# Patient Record
Sex: FEMALE | Race: WHITE | NOT HISPANIC OR LATINO | Employment: UNEMPLOYED | RURAL
[De-identification: names, ages, dates, MRNs, and addresses within clinical notes are randomized per-mention and may not be internally consistent; named-entity substitution may affect disease eponyms.]

---

## 2020-01-21 ENCOUNTER — HISTORICAL (OUTPATIENT)
Dept: ADMINISTRATIVE | Facility: HOSPITAL | Age: 40
End: 2020-01-21

## 2020-01-24 LAB
LAB AP GENERAL CAT - HISTORICAL: NORMAL
LAB AP INTERPRETATION/RESULT - HISTORICAL: NEGATIVE
LAB AP SPECIMEN ADEQUACY - HISTORICAL: NORMAL
LAB AP SPECIMEN SUBMITTED - HISTORICAL: NORMAL

## 2020-03-05 ENCOUNTER — HISTORICAL (OUTPATIENT)
Dept: ADMINISTRATIVE | Facility: HOSPITAL | Age: 40
End: 2020-03-05

## 2020-03-05 LAB
BASOPHILS # BLD AUTO: 0.02 X10E3/UL (ref 0–0.2)
BASOPHILS NFR BLD AUTO: 0.5 % (ref 0–1)
BUN SERPL-MCNC: 10 MG/DL (ref 7–18)
CALCIUM SERPL-MCNC: 8.6 MG/DL (ref 8.5–10.1)
CHLORIDE SERPL-SCNC: 103 MMOL/L (ref 98–107)
CO2 SERPL-SCNC: 26 MMOL/L (ref 21–32)
CREAT SERPL-MCNC: 0.96 MG/DL (ref 0.55–1.02)
EOSINOPHIL # BLD AUTO: 0.01 X10E3/UL (ref 0–0.5)
EOSINOPHIL NFR BLD AUTO: 0.2 % (ref 1–4)
ERYTHROCYTE [DISTWIDTH] IN BLOOD BY AUTOMATED COUNT: 12.8 % (ref 11.5–14.5)
GLUCOSE SERPL-MCNC: 80 MG/DL (ref 74–106)
HCT VFR BLD AUTO: 41.3 % (ref 38–47)
HGB BLD-MCNC: 13.4 G/DL (ref 12–16)
IMM GRANULOCYTES # BLD AUTO: 0.01 X10E3/UL (ref 0–0.04)
IMM GRANULOCYTES NFR BLD: 0.2 % (ref 0–0.4)
LYMPHOCYTES # BLD AUTO: 1.58 X10E3/UL (ref 1–4.8)
LYMPHOCYTES NFR BLD AUTO: 35.8 % (ref 27–41)
MCH RBC QN AUTO: 28 PG (ref 27–31)
MCHC RBC AUTO-ENTMCNC: 32.4 G/DL (ref 32–36)
MCV RBC AUTO: 86.2 FL (ref 80–96)
MONOCYTES # BLD AUTO: 0.46 X10E3/UL (ref 0–0.8)
MONOCYTES NFR BLD AUTO: 10.4 % (ref 2–6)
MPC BLD CALC-MCNC: 10.5 FL (ref 9.4–12.4)
NEUTROPHILS # BLD AUTO: 2.33 X10E3/UL (ref 1.8–7.7)
NEUTROPHILS NFR BLD AUTO: 52.9 % (ref 53–65)
NRBC # BLD AUTO: 0 X10E3/UL (ref 0–0)
NRBC, AUTO (.00): 0 /100 (ref 0–0)
PLATELET # BLD AUTO: 225 X10E3/UL (ref 150–400)
POTASSIUM SERPL-SCNC: 3.6 MMOL/L (ref 3.5–5.1)
RBC # BLD AUTO: 4.79 X10E6/UL (ref 4.2–5.4)
SODIUM SERPL-SCNC: 138 MMOL/L (ref 136–145)
WBC # BLD AUTO: 4.41 X10E3/UL (ref 4.5–11)

## 2020-03-06 ENCOUNTER — HISTORICAL (OUTPATIENT)
Dept: ADMINISTRATIVE | Facility: HOSPITAL | Age: 40
End: 2020-03-06

## 2020-03-06 LAB — RAPID GROUP A STREP ANTIGEN: NEGATIVE

## 2022-02-24 ENCOUNTER — OFFICE VISIT (OUTPATIENT)
Dept: OBSTETRICS AND GYNECOLOGY | Facility: CLINIC | Age: 42
End: 2022-02-24
Payer: MEDICAID

## 2022-02-24 VITALS
RESPIRATION RATE: 18 BRPM | DIASTOLIC BLOOD PRESSURE: 87 MMHG | TEMPERATURE: 98 F | HEIGHT: 66 IN | SYSTOLIC BLOOD PRESSURE: 113 MMHG | BODY MASS INDEX: 31.34 KG/M2 | HEART RATE: 90 BPM | WEIGHT: 195 LBS

## 2022-02-24 DIAGNOSIS — N92.0 MENORRHAGIA WITH REGULAR CYCLE: Primary | ICD-10-CM

## 2022-02-24 LAB
BASOPHILS # BLD AUTO: 0.06 K/UL (ref 0–0.2)
BASOPHILS NFR BLD AUTO: 0.8 % (ref 0–1)
DIFFERENTIAL METHOD BLD: ABNORMAL
EOSINOPHIL # BLD AUTO: 0.07 K/UL (ref 0–0.5)
EOSINOPHIL NFR BLD AUTO: 1 % (ref 1–4)
ERYTHROCYTE [DISTWIDTH] IN BLOOD BY AUTOMATED COUNT: 13.6 % (ref 11.5–14.5)
FSH SERPL-ACNC: 5.6 MIU/ML (ref 1.7–134.8)
HCT VFR BLD AUTO: 37.5 % (ref 38–47)
HGB BLD-MCNC: 12.2 G/DL (ref 12–16)
IMM GRANULOCYTES # BLD AUTO: 0.02 K/UL (ref 0–0.04)
IMM GRANULOCYTES NFR BLD: 0.3 % (ref 0–0.4)
LH SERPL-ACNC: 5 MIU/ML
LYMPHOCYTES # BLD AUTO: 2.93 K/UL (ref 1–4.8)
LYMPHOCYTES NFR BLD AUTO: 41.4 % (ref 27–41)
MCH RBC QN AUTO: 27.4 PG (ref 27–31)
MCHC RBC AUTO-ENTMCNC: 32.5 G/DL (ref 32–36)
MCV RBC AUTO: 84.3 FL (ref 80–96)
MONOCYTES # BLD AUTO: 0.48 K/UL (ref 0–0.8)
MONOCYTES NFR BLD AUTO: 6.8 % (ref 2–6)
MPC BLD CALC-MCNC: 11.2 FL (ref 9.4–12.4)
NEUTROPHILS # BLD AUTO: 3.51 K/UL (ref 1.8–7.7)
NEUTROPHILS NFR BLD AUTO: 49.7 % (ref 53–65)
NRBC # BLD AUTO: 0 X10E3/UL
NRBC, AUTO (.00): 0 %
PLATELET # BLD AUTO: 297 K/UL (ref 150–400)
RBC # BLD AUTO: 4.45 M/UL (ref 4.2–5.4)
TESTOST SERPL-MCNC: 27 NG/DL (ref 8–60)
TSH SERPL DL<=0.005 MIU/L-ACNC: 1.65 UIU/ML (ref 0.36–3.74)
WBC # BLD AUTO: 7.07 K/UL (ref 4.5–11)

## 2022-02-24 PROCEDURE — 84403 TESTOSTERONE: ICD-10-PCS | Mod: ,,, | Performed by: CLINICAL MEDICAL LABORATORY

## 2022-02-24 PROCEDURE — 3074F SYST BP LT 130 MM HG: CPT | Mod: CPTII,,, | Performed by: OBSTETRICS & GYNECOLOGY

## 2022-02-24 PROCEDURE — 84443 TSH: ICD-10-PCS | Mod: ,,, | Performed by: CLINICAL MEDICAL LABORATORY

## 2022-02-24 PROCEDURE — 85025 CBC WITH DIFFERENTIAL: ICD-10-PCS | Mod: ,,, | Performed by: CLINICAL MEDICAL LABORATORY

## 2022-02-24 PROCEDURE — 3074F PR MOST RECENT SYSTOLIC BLOOD PRESSURE < 130 MM HG: ICD-10-PCS | Mod: CPTII,,, | Performed by: OBSTETRICS & GYNECOLOGY

## 2022-02-24 PROCEDURE — 83001 ASSAY OF GONADOTROPIN (FSH): CPT | Mod: ,,, | Performed by: CLINICAL MEDICAL LABORATORY

## 2022-02-24 PROCEDURE — 3079F DIAST BP 80-89 MM HG: CPT | Mod: CPTII,,, | Performed by: OBSTETRICS & GYNECOLOGY

## 2022-02-24 PROCEDURE — 84443 ASSAY THYROID STIM HORMONE: CPT | Mod: ,,, | Performed by: CLINICAL MEDICAL LABORATORY

## 2022-02-24 PROCEDURE — 84403 ASSAY OF TOTAL TESTOSTERONE: CPT | Mod: ,,, | Performed by: CLINICAL MEDICAL LABORATORY

## 2022-02-24 PROCEDURE — 83002 LUTEINIZING HORMONE: ICD-10-PCS | Mod: ,,, | Performed by: CLINICAL MEDICAL LABORATORY

## 2022-02-24 PROCEDURE — 85025 COMPLETE CBC W/AUTO DIFF WBC: CPT | Mod: ,,, | Performed by: CLINICAL MEDICAL LABORATORY

## 2022-02-24 PROCEDURE — 36415 COLL VENOUS BLD VENIPUNCTURE: CPT | Mod: ,,, | Performed by: OBSTETRICS & GYNECOLOGY

## 2022-02-24 PROCEDURE — 99203 OFFICE O/P NEW LOW 30 MIN: CPT | Mod: ,,, | Performed by: OBSTETRICS & GYNECOLOGY

## 2022-02-24 PROCEDURE — 83001 FOLLICLE STIMULATING HORMONE: ICD-10-PCS | Mod: ,,, | Performed by: CLINICAL MEDICAL LABORATORY

## 2022-02-24 PROCEDURE — 99203 PR OFFICE/OUTPT VISIT, NEW, LEVL III, 30-44 MIN: ICD-10-PCS | Mod: ,,, | Performed by: OBSTETRICS & GYNECOLOGY

## 2022-02-24 PROCEDURE — 1159F PR MEDICATION LIST DOCUMENTED IN MEDICAL RECORD: ICD-10-PCS | Mod: CPTII,,, | Performed by: OBSTETRICS & GYNECOLOGY

## 2022-02-24 PROCEDURE — 36415 PR COLLECTION VENOUS BLOOD,VENIPUNCTURE: ICD-10-PCS | Mod: ,,, | Performed by: OBSTETRICS & GYNECOLOGY

## 2022-02-24 PROCEDURE — 3079F PR MOST RECENT DIASTOLIC BLOOD PRESSURE 80-89 MM HG: ICD-10-PCS | Mod: CPTII,,, | Performed by: OBSTETRICS & GYNECOLOGY

## 2022-02-24 PROCEDURE — 83002 ASSAY OF GONADOTROPIN (LH): CPT | Mod: ,,, | Performed by: CLINICAL MEDICAL LABORATORY

## 2022-02-24 PROCEDURE — 3008F BODY MASS INDEX DOCD: CPT | Mod: CPTII,,, | Performed by: OBSTETRICS & GYNECOLOGY

## 2022-02-24 PROCEDURE — 3008F PR BODY MASS INDEX (BMI) DOCUMENTED: ICD-10-PCS | Mod: CPTII,,, | Performed by: OBSTETRICS & GYNECOLOGY

## 2022-02-24 PROCEDURE — 1159F MED LIST DOCD IN RCRD: CPT | Mod: CPTII,,, | Performed by: OBSTETRICS & GYNECOLOGY

## 2022-02-24 NOTE — PROGRESS NOTES
History & Physical    SUBJECTIVE:     History of Present Illness:  Patient is a 41 y.o. female presents with possible pregnancy/ Pt stats she had a tubal reversal and symptoms of pregnancy with tender breasts and nausea but pt states her LMP 02/10/2022.      Chief Complaint   Patient presents with    Miscarriage     Pt states she had a positive UPT 09/2021 then started bleeding 10/2021, UPT negative 10/2021. Pt states she thinks she may have had a miscarriage.       Review of patient's allergies indicates:  No Known Allergies    No current outpatient medications on file.     No current facility-administered medications for this visit.       Past Medical History:   Diagnosis Date    Abnormal Pap smear of cervix 2003     Past Surgical History:   Procedure Laterality Date    CERVICAL BIOPSY  W/ LOOP ELECTRODE EXCISION      CHOLECYSTECTOMY      FALLOPIAN TUBOPLASTY Bilateral     TUBAL LIGATION  2003     Family History   Family history unknown: Yes     Social History     Tobacco Use    Smoking status: Never Smoker    Smokeless tobacco: Never Used   Substance Use Topics    Alcohol use: Not Currently    Drug use: Not Currently        Review of Systems:  Review of Systems   Constitutional: Negative for appetite change, chills, fatigue and fever.   HENT: Negative.    Eyes: Negative.    Respiratory: Negative for cough, chest tightness and shortness of breath.    Cardiovascular: Negative for chest pain, palpitations and leg swelling.   Gastrointestinal: Positive for nausea. Negative for abdominal distention, abdominal pain, blood in stool, constipation, diarrhea and vomiting.   Endocrine: Negative for cold intolerance, heat intolerance, polydipsia, polyphagia and polyuria.   Genitourinary: Negative for difficulty urinating, dyspareunia, dysuria, flank pain, frequency, pelvic pain, urgency, vaginal bleeding, vaginal discharge and vaginal pain.   Musculoskeletal: Negative.    Skin: Negative.    Neurological: Negative.     Psychiatric/Behavioral: Negative.  Negative for agitation, behavioral problems, confusion and sleep disturbance. The patient is not nervous/anxious.         Office Visit on 02/24/2022   Component Date Value Ref Range Status    FSH 02/24/2022 5.6  1.7 - 134.8 mIU/mL Final      Men:     0.7 - 10.8 mIU/mL    Women:  Follicular Phase:      2.3 - 12.6 mIU/mL  Mid-Cycle Peak:      5.2 - 17.5 mIU/mL  Luteal Phase:          1.7 - 12.9 mIU/mL    Postmenopausal:  On Hormone Therapy:     5.9 - 72.8 mIU/mL  Untreated:                        12.7 - 132.2 mIU/mL    LH, Blood 02/24/2022 5.0  mIu/mL Final      Men:       1.2 - 10.6 mIU/mL    Women:  Follicular Phase:        1.9 - 12.8 mIU/mL  Mid-Cycle Peak:        22.8 - 76.1 mIU/mL  Luteal Phase:             0.6 - 13.5 mIU/mL    Post-Menopausal:  On Hormone Therapy:           1.1 - 52.4 mIU/mL  Untreated:                              0.6 - 61.8 mIU/mL        TSH 02/24/2022 1.650  0.358 - 3.740 uIU/mL Final    Testosterone 02/24/2022 27.0  8.0 - 60.0 ng/dL Final    WBC 02/24/2022 7.07  4.50 - 11.00 K/uL Final    RBC 02/24/2022 4.45  4.20 - 5.40 M/uL Final    Hemoglobin 02/24/2022 12.2  12.0 - 16.0 g/dL Final    Hematocrit 02/24/2022 37.5 (A) 38.0 - 47.0 % Final    MCV 02/24/2022 84.3  80.0 - 96.0 fL Final    MCH 02/24/2022 27.4  27.0 - 31.0 pg Final    MCHC 02/24/2022 32.5  32.0 - 36.0 g/dL Final    RDW 02/24/2022 13.6  11.5 - 14.5 % Final    Platelet Count 02/24/2022 297  150 - 400 K/uL Final    MPV 02/24/2022 11.2  9.4 - 12.4 fL Final    Neutrophils % 02/24/2022 49.7 (A) 53.0 - 65.0 % Final    Lymphocytes % 02/24/2022 41.4 (A) 27.0 - 41.0 % Final    Monocytes % 02/24/2022 6.8 (A) 2.0 - 6.0 % Final    Eosinophils % 02/24/2022 1.0  1.0 - 4.0 % Final    Basophils % 02/24/2022 0.8  0.0 - 1.0 % Final    Immature Granulocytes % 02/24/2022 0.3  0.0 - 0.4 % Final    nRBC, Auto 02/24/2022 0.0  <=0.0 % Final    Neutrophils, Abs 02/24/2022 3.51  1.80 - 7.70 K/uL  "Final    Lymphocytes, Absolute 02/24/2022 2.93  1.00 - 4.80 K/uL Final    Monocytes, Absolute 02/24/2022 0.48  0.00 - 0.80 K/uL Final    Eosinophils, Absolute 02/24/2022 0.07  0.00 - 0.50 K/uL Final    Basophils, Absolute 02/24/2022 0.06  0.00 - 0.20 K/uL Final    Immature Granulocytes, Absolute 02/24/2022 0.02  0.00 - 0.04 K/uL Final    nRBC, Absolute 02/24/2022 0.00  <=0.00 x10e3/uL Final    Diff Type 02/24/2022 Auto   Final         OBJECTIVE:     Vital Signs (Most Recent)  Temp: 98.1 °F (36.7 °C) (02/24/22 1217)  Pulse: 90 (02/24/22 1217)  Resp: 18 (02/24/22 1217)  BP: 113/87 (02/24/22 1217)  5' 6" (1.676 m)  88.5 kg (195 lb)     Physical Exam:  Physical Exam  Vitals reviewed. Exam conducted with a chaperone present.   Constitutional:       Appearance: Normal appearance.   HENT:      Head: Normocephalic and atraumatic.      Mouth/Throat:      Mouth: Mucous membranes are moist.   Eyes:      Extraocular Movements: Extraocular movements intact.      Pupils: Pupils are equal, round, and reactive to light.   Cardiovascular:      Rate and Rhythm: Normal rate and regular rhythm.      Pulses: Normal pulses.      Heart sounds: Normal heart sounds.   Pulmonary:      Effort: Pulmonary effort is normal.      Breath sounds: Normal breath sounds.   Abdominal:      General: Abdomen is flat. Bowel sounds are normal.      Palpations: Abdomen is soft.   Musculoskeletal:         General: Normal range of motion.      Cervical back: Normal range of motion.   Skin:     General: Skin is warm and dry.   Neurological:      General: No focal deficit present.      Mental Status: She is alert and oriented to person, place, and time.   Psychiatric:         Mood and Affect: Mood normal.         Behavior: Behavior normal.         Thought Content: Thought content normal.         Judgment: Judgment normal.         Laboratory  Office Visit on 02/24/2022   Component Date Value Ref Range Status    FSH 02/24/2022 5.6  1.7 - 134.8 mIU/mL " Final      Men:     0.7 - 10.8 mIU/mL    Women:  Follicular Phase:      2.3 - 12.6 mIU/mL  Mid-Cycle Peak:      5.2 - 17.5 mIU/mL  Luteal Phase:          1.7 - 12.9 mIU/mL    Postmenopausal:  On Hormone Therapy:     5.9 - 72.8 mIU/mL  Untreated:                        12.7 - 132.2 mIU/mL    LH, Blood 02/24/2022 5.0  mIu/mL Final      Men:       1.2 - 10.6 mIU/mL    Women:  Follicular Phase:        1.9 - 12.8 mIU/mL  Mid-Cycle Peak:        22.8 - 76.1 mIU/mL  Luteal Phase:             0.6 - 13.5 mIU/mL    Post-Menopausal:  On Hormone Therapy:           1.1 - 52.4 mIU/mL  Untreated:                              0.6 - 61.8 mIU/mL        TSH 02/24/2022 1.650  0.358 - 3.740 uIU/mL Final    Testosterone 02/24/2022 27.0  8.0 - 60.0 ng/dL Final    WBC 02/24/2022 7.07  4.50 - 11.00 K/uL Final    RBC 02/24/2022 4.45  4.20 - 5.40 M/uL Final    Hemoglobin 02/24/2022 12.2  12.0 - 16.0 g/dL Final    Hematocrit 02/24/2022 37.5 (A) 38.0 - 47.0 % Final    MCV 02/24/2022 84.3  80.0 - 96.0 fL Final    MCH 02/24/2022 27.4  27.0 - 31.0 pg Final    MCHC 02/24/2022 32.5  32.0 - 36.0 g/dL Final    RDW 02/24/2022 13.6  11.5 - 14.5 % Final    Platelet Count 02/24/2022 297  150 - 400 K/uL Final    MPV 02/24/2022 11.2  9.4 - 12.4 fL Final    Neutrophils % 02/24/2022 49.7 (A) 53.0 - 65.0 % Final    Lymphocytes % 02/24/2022 41.4 (A) 27.0 - 41.0 % Final    Monocytes % 02/24/2022 6.8 (A) 2.0 - 6.0 % Final    Eosinophils % 02/24/2022 1.0  1.0 - 4.0 % Final    Basophils % 02/24/2022 0.8  0.0 - 1.0 % Final    Immature Granulocytes % 02/24/2022 0.3  0.0 - 0.4 % Final    nRBC, Auto 02/24/2022 0.0  <=0.0 % Final    Neutrophils, Abs 02/24/2022 3.51  1.80 - 7.70 K/uL Final    Lymphocytes, Absolute 02/24/2022 2.93  1.00 - 4.80 K/uL Final    Monocytes, Absolute 02/24/2022 0.48  0.00 - 0.80 K/uL Final    Eosinophils, Absolute 02/24/2022 0.07  0.00 - 0.50 K/uL Final    Basophils, Absolute 02/24/2022 0.06  0.00 - 0.20 K/uL Final     Immature Granulocytes, Absolute 02/24/2022 0.02  0.00 - 0.04 K/uL Final    nRBC, Absolute 02/24/2022 0.00  <=0.00 x10e3/uL Final    Diff Type 02/24/2022 Auto   Final           ASSESSMENT/PLAN:   Sabiha was seen today for miscarriage.    Diagnoses and all orders for this visit:    Menorrhagia with regular cycle  -     US Pelvis Complete Non OB; Future  -     Follicle Stimulating Hormone; Future  -     Luteinizing Hormone; Future  -     TSH; Future  -     CBC Auto Differential; Future  -     Testosterone; Future  -     Follicle Stimulating Hormone  -     Luteinizing Hormone  -     TSH  -     CBC Auto Differential  -     Testosterone        PLAN:Plan      see above

## 2022-03-29 ENCOUNTER — OFFICE VISIT (OUTPATIENT)
Dept: OBSTETRICS AND GYNECOLOGY | Facility: CLINIC | Age: 42
End: 2022-03-29
Payer: MEDICAID

## 2022-03-29 VITALS
RESPIRATION RATE: 18 BRPM | BODY MASS INDEX: 31.82 KG/M2 | HEIGHT: 66 IN | SYSTOLIC BLOOD PRESSURE: 121 MMHG | HEART RATE: 85 BPM | DIASTOLIC BLOOD PRESSURE: 80 MMHG | WEIGHT: 198 LBS

## 2022-03-29 DIAGNOSIS — Z01.419 ROUTINE GYNECOLOGICAL EXAMINATION: Primary | ICD-10-CM

## 2022-03-29 DIAGNOSIS — Z12.31 SCREENING MAMMOGRAM FOR BREAST CANCER: ICD-10-CM

## 2022-03-29 DIAGNOSIS — Z12.4 SCREENING FOR MALIGNANT NEOPLASM OF THE CERVIX: ICD-10-CM

## 2022-03-29 PROCEDURE — 3008F PR BODY MASS INDEX (BMI) DOCUMENTED: ICD-10-PCS | Mod: CPTII,,, | Performed by: OBSTETRICS & GYNECOLOGY

## 2022-03-29 PROCEDURE — 3079F DIAST BP 80-89 MM HG: CPT | Mod: CPTII,,, | Performed by: OBSTETRICS & GYNECOLOGY

## 2022-03-29 PROCEDURE — 99396 PR PREVENTIVE VISIT,EST,40-64: ICD-10-PCS | Mod: ,,, | Performed by: OBSTETRICS & GYNECOLOGY

## 2022-03-29 PROCEDURE — 87624 HPV HI-RISK TYP POOLED RSLT: CPT | Mod: ,,, | Performed by: CLINICAL MEDICAL LABORATORY

## 2022-03-29 PROCEDURE — 3074F SYST BP LT 130 MM HG: CPT | Mod: CPTII,,, | Performed by: OBSTETRICS & GYNECOLOGY

## 2022-03-29 PROCEDURE — 99396 PREV VISIT EST AGE 40-64: CPT | Mod: ,,, | Performed by: OBSTETRICS & GYNECOLOGY

## 2022-03-29 PROCEDURE — 3074F PR MOST RECENT SYSTOLIC BLOOD PRESSURE < 130 MM HG: ICD-10-PCS | Mod: CPTII,,, | Performed by: OBSTETRICS & GYNECOLOGY

## 2022-03-29 PROCEDURE — 1159F PR MEDICATION LIST DOCUMENTED IN MEDICAL RECORD: ICD-10-PCS | Mod: CPTII,,, | Performed by: OBSTETRICS & GYNECOLOGY

## 2022-03-29 PROCEDURE — 1159F MED LIST DOCD IN RCRD: CPT | Mod: CPTII,,, | Performed by: OBSTETRICS & GYNECOLOGY

## 2022-03-29 PROCEDURE — 3008F BODY MASS INDEX DOCD: CPT | Mod: CPTII,,, | Performed by: OBSTETRICS & GYNECOLOGY

## 2022-03-29 PROCEDURE — 3079F PR MOST RECENT DIASTOLIC BLOOD PRESSURE 80-89 MM HG: ICD-10-PCS | Mod: CPTII,,, | Performed by: OBSTETRICS & GYNECOLOGY

## 2022-03-29 PROCEDURE — 88142 CYTOPATH C/V THIN LAYER: CPT | Mod: GCY | Performed by: OBSTETRICS & GYNECOLOGY

## 2022-03-29 PROCEDURE — 87624 HUMAN PAPILLOMAVIRUS (HPV): ICD-10-PCS | Mod: ,,, | Performed by: CLINICAL MEDICAL LABORATORY

## 2022-03-29 NOTE — PROGRESS NOTES
"CC: Well woman exam    Sabiha Dunbar is a 41 y.o. female  presents for well woman exam.  LMP: No LMP recorded..  No issues, problems, or complaints.      Past Medical History:   Diagnosis Date    Abnormal Pap smear of cervix      Past Surgical History:   Procedure Laterality Date    CERVICAL BIOPSY  W/ LOOP ELECTRODE EXCISION      CHOLECYSTECTOMY      FALLOPIAN TUBOPLASTY Bilateral     TUBAL LIGATION       Social History     Socioeconomic History    Marital status:    Tobacco Use    Smoking status: Never Smoker    Smokeless tobacco: Never Used   Substance and Sexual Activity    Alcohol use: Not Currently    Drug use: Not Currently    Sexual activity: Yes     Partners: Male     Birth control/protection: None, See Surgical Hx     Family History   Family history unknown: Yes     OB History        4    Para   4    Term   4            AB        Living   4       SAB        IAB        Ectopic        Multiple        Live Births   4                 /80 (BP Location: Right arm, Patient Position: Sitting)   Pulse 85   Resp 18   Ht 5' 6" (1.676 m)   Wt 89.8 kg (198 lb)   BMI 31.96 kg/m²       Office Visit on 2022   Component Date Value Ref Range Status    FSH 2022 5.6  1.7 - 134.8 mIU/mL Final      Men:     0.7 - 10.8 mIU/mL    Women:  Follicular Phase:      2.3 - 12.6 mIU/mL  Mid-Cycle Peak:      5.2 - 17.5 mIU/mL  Luteal Phase:          1.7 - 12.9 mIU/mL    Postmenopausal:  On Hormone Therapy:     5.9 - 72.8 mIU/mL  Untreated:                        12.7 - 132.2 mIU/mL    LH, Blood 2022 5.0  mIu/mL Final      Men:       1.2 - 10.6 mIU/mL    Women:  Follicular Phase:        1.9 - 12.8 mIU/mL  Mid-Cycle Peak:        22.8 - 76.1 mIU/mL  Luteal Phase:             0.6 - 13.5 mIU/mL    Post-Menopausal:  On Hormone Therapy:           1.1 - 52.4 mIU/mL  Untreated:                              0.6 - 61.8 mIU/mL        TSH 2022 1.650  0.358 - 3.740 " uIU/mL Final    Testosterone 02/24/2022 27.0  8.0 - 60.0 ng/dL Final    WBC 02/24/2022 7.07  4.50 - 11.00 K/uL Final    RBC 02/24/2022 4.45  4.20 - 5.40 M/uL Final    Hemoglobin 02/24/2022 12.2  12.0 - 16.0 g/dL Final    Hematocrit 02/24/2022 37.5 (A) 38.0 - 47.0 % Final    MCV 02/24/2022 84.3  80.0 - 96.0 fL Final    MCH 02/24/2022 27.4  27.0 - 31.0 pg Final    MCHC 02/24/2022 32.5  32.0 - 36.0 g/dL Final    RDW 02/24/2022 13.6  11.5 - 14.5 % Final    Platelet Count 02/24/2022 297  150 - 400 K/uL Final    MPV 02/24/2022 11.2  9.4 - 12.4 fL Final    Neutrophils % 02/24/2022 49.7 (A) 53.0 - 65.0 % Final    Lymphocytes % 02/24/2022 41.4 (A) 27.0 - 41.0 % Final    Monocytes % 02/24/2022 6.8 (A) 2.0 - 6.0 % Final    Eosinophils % 02/24/2022 1.0  1.0 - 4.0 % Final    Basophils % 02/24/2022 0.8  0.0 - 1.0 % Final    Immature Granulocytes % 02/24/2022 0.3  0.0 - 0.4 % Final    nRBC, Auto 02/24/2022 0.0  <=0.0 % Final    Neutrophils, Abs 02/24/2022 3.51  1.80 - 7.70 K/uL Final    Lymphocytes, Absolute 02/24/2022 2.93  1.00 - 4.80 K/uL Final    Monocytes, Absolute 02/24/2022 0.48  0.00 - 0.80 K/uL Final    Eosinophils, Absolute 02/24/2022 0.07  0.00 - 0.50 K/uL Final    Basophils, Absolute 02/24/2022 0.06  0.00 - 0.20 K/uL Final    Immature Granulocytes, Absolute 02/24/2022 0.02  0.00 - 0.04 K/uL Final    nRBC, Absolute 02/24/2022 0.00  <=0.00 x10e3/uL Final    Diff Type 02/24/2022 Auto   Final     ROS:  GENERAL: Denies weight gain or weight loss. Feeling well overall.   SKIN: Denies rash or lesions.   HEAD: Denies head injury or headache.   NODES: Denies enlarged lymph nodes.   CHEST: Denies chest pain or shortness of breath.   CARDIOVASCULAR: Denies palpitations or left sided chest pain.   ABDOMEN: No abdominal pain, constipation, diarrhea, nausea, vomiting or rectal bleeding.   URINARY: No frequency, dysuria, hematuria, or burning on urination.  REPRODUCTIVE: See HPI.   BREASTS: The patient  performs breast self-examination and denies pain, lumps, or nipple discharge.   HEMATOLOGIC: No easy bruisability or excessive bleeding.   MUSCULOSKELETAL: Denies joint pain or swelling.   NEUROLOGIC: Denies syncope or weakness.   PSYCHIATRIC: Denies depression, anxiety or mood swings.    PHYSICAL EXAM:  APPEARANCE: Well nourished, well developed, in no acute distress.  AFFECT: WNL, alert and oriented x 3  SKIN: No acne or hirsutism  NECK: Neck symmetric without masses or thyromegaly  NODES: No inguinal, cervical, axillary, or femoral lymph node enlargement  CHEST: Good respiratory effect  ABDOMEN: Soft.  No tenderness or masses.  No hepatosplenomegaly.  No hernias.  BREASTS: Symmetrical, no skin changes or visible lesions.  No palpable masses, nipple discharge bilaterally.  PELVIC: Normal external genitalia without lesions.  Normal hair distribution.  Adequate perineal body, normal urethral meatus.  Vagina moist and well rugated without lesions or discharge.  Cervix pink, without lesions, discharge or tenderness.  No significant cystocele or rectocele.  Bimanual exam shows uterus to be normal size, regular, mobile and nontender.  Adnexa without masses or tenderness.    EXTREMITIES: No edema.    Routine gynecological examination  -     ThinPrep Pap Test; Future; Expected date: 03/29/2022    Screening for malignant neoplasm of the cervix  -     ThinPrep Pap Test; Future; Expected date: 03/29/2022    Screening mammogram for breast cancer  -     Mammo Digital Screening Bilat; Future; Expected date: 03/29/2022            Patient was counseled today on A.C.S. Pap guidelines and recommendations for yearly pelvic exams, mammograms and monthly self breast exams; to see her PCP for other health maintenance.   Exercise regimen encouraged  Healthy food choices encouraged  Questions answered to desired level of satisfaction  Verbalized understanding to all information and instructions    Pt desires pregnancy. Fertility window  with recommendations provided.     Follow up in about 1 year (around 3/29/2023) for Annual Exam.

## 2022-04-01 LAB
GH SERPL-MCNC: NORMAL NG/ML
HPV 16: NEGATIVE
HPV 18: NEGATIVE
HPV OTHER: NEGATIVE
INSULIN SERPL-ACNC: NORMAL U[IU]/ML
LAB AP CLINICAL INFORMATION: NORMAL
LAB AP GYN INTERPRETATION: NEGATIVE
LAB AP PAP DISCLAIMER COMMENTS: NORMAL
RENIN PLAS-CCNC: NORMAL NG/ML/H

## 2022-04-06 ENCOUNTER — HOSPITAL ENCOUNTER (OUTPATIENT)
Dept: RADIOLOGY | Facility: HOSPITAL | Age: 42
Discharge: HOME | End: 2022-04-06
Attending: OBSTETRICS & GYNECOLOGY
Payer: MEDICAID

## 2022-04-06 VITALS — BODY MASS INDEX: 31.82 KG/M2 | WEIGHT: 198 LBS | HEIGHT: 66 IN

## 2022-04-06 DIAGNOSIS — Z12.31 SCREENING MAMMOGRAM FOR BREAST CANCER: ICD-10-CM

## 2022-04-06 PROCEDURE — 77067 SCR MAMMO BI INCL CAD: CPT | Mod: TC

## 2022-04-12 ENCOUNTER — HOSPITAL ENCOUNTER (OUTPATIENT)
Dept: RADIOLOGY | Facility: HOSPITAL | Age: 42
Discharge: HOME | End: 2022-04-12
Attending: RADIOLOGY
Payer: MEDICAID

## 2022-04-12 VITALS — HEIGHT: 66 IN | WEIGHT: 198 LBS | BODY MASS INDEX: 31.82 KG/M2

## 2022-04-12 DIAGNOSIS — R92.8 ABNORMAL FINDING ON BREAST IMAGING: ICD-10-CM

## 2022-04-12 PROCEDURE — 76642 ULTRASOUND BREAST LIMITED: CPT | Mod: TC,LT

## 2022-04-12 PROCEDURE — 77065 DX MAMMO INCL CAD UNI: CPT | Mod: TC,LT

## 2022-05-21 ENCOUNTER — HOSPITAL ENCOUNTER (EMERGENCY)
Facility: HOSPITAL | Age: 42
Discharge: HOME | End: 2022-05-21
Payer: MEDICAID

## 2022-05-21 VITALS
OXYGEN SATURATION: 100 % | TEMPERATURE: 98 F | HEIGHT: 66 IN | DIASTOLIC BLOOD PRESSURE: 66 MMHG | HEART RATE: 81 BPM | SYSTOLIC BLOOD PRESSURE: 102 MMHG | BODY MASS INDEX: 32.62 KG/M2 | WEIGHT: 203 LBS | RESPIRATION RATE: 18 BRPM

## 2022-05-21 DIAGNOSIS — G43.009 MIGRAINE WITHOUT AURA AND WITHOUT STATUS MIGRAINOSUS, NOT INTRACTABLE: Primary | ICD-10-CM

## 2022-05-21 PROCEDURE — 63600175 PHARM REV CODE 636 W HCPCS: Performed by: NURSE PRACTITIONER

## 2022-05-21 PROCEDURE — 96361 HYDRATE IV INFUSION ADD-ON: CPT | Performed by: NURSE PRACTITIONER

## 2022-05-21 PROCEDURE — 25000003 PHARM REV CODE 250: Performed by: NURSE PRACTITIONER

## 2022-05-21 PROCEDURE — 99283 PR EMERGENCY DEPT VISIT,LEVEL III: ICD-10-PCS | Mod: ,,, | Performed by: NURSE PRACTITIONER

## 2022-05-21 PROCEDURE — 96365 THER/PROPH/DIAG IV INF INIT: CPT | Performed by: NURSE PRACTITIONER

## 2022-05-21 PROCEDURE — 99284 EMERGENCY DEPT VISIT MOD MDM: CPT | Mod: 25 | Performed by: NURSE PRACTITIONER

## 2022-05-21 PROCEDURE — 96375 TX/PRO/DX INJ NEW DRUG ADDON: CPT | Performed by: NURSE PRACTITIONER

## 2022-05-21 PROCEDURE — 99283 EMERGENCY DEPT VISIT LOW MDM: CPT | Mod: ,,, | Performed by: NURSE PRACTITIONER

## 2022-05-21 RX ORDER — KETOROLAC TROMETHAMINE 30 MG/ML
15 INJECTION, SOLUTION INTRAMUSCULAR; INTRAVENOUS
Status: COMPLETED | OUTPATIENT
Start: 2022-05-21 | End: 2022-05-21

## 2022-05-21 RX ORDER — PROMETHAZINE HYDROCHLORIDE 25 MG/1
25 TABLET ORAL EVERY 4 HOURS PRN
Qty: 20 TABLET | Refills: 0 | Status: SHIPPED | OUTPATIENT
Start: 2022-05-21 | End: 2023-02-14

## 2022-05-21 RX ADMIN — PROMETHAZINE HYDROCHLORIDE 25 MG: 25 INJECTION INTRAMUSCULAR; INTRAVENOUS at 08:05

## 2022-05-21 RX ADMIN — KETOROLAC TROMETHAMINE 15 MG: 30 INJECTION, SOLUTION INTRAMUSCULAR at 08:05

## 2022-05-21 RX ADMIN — SODIUM CHLORIDE 1000 ML: 9 INJECTION, SOLUTION INTRAVENOUS at 08:05

## 2022-05-22 NOTE — ED PROVIDER NOTES
Encounter Date: 5/21/2022       History     Chief Complaint   Patient presents with    Headache     Reports right parietal headache onset around 1000, progressively worsening. Consistent with previous migraines, though not as severe. Not currently followed by neurology. Denies photo/phonophobia. Reports nausea, denies vomiting. Minimal water intake.        Review of patient's allergies indicates:   Allergen Reactions    Biaxin [clarithromycin]      Past Medical History:   Diagnosis Date    Abnormal Pap smear of cervix 2003     Past Surgical History:   Procedure Laterality Date    CERVICAL BIOPSY  W/ LOOP ELECTRODE EXCISION      CHOLECYSTECTOMY      FALLOPIAN TUBOPLASTY Bilateral     TUBAL LIGATION  2003     Family History   Family history unknown: Yes     Social History     Tobacco Use    Smoking status: Never Smoker    Smokeless tobacco: Never Used   Substance Use Topics    Alcohol use: Not Currently    Drug use: Not Currently     Review of Systems   Constitutional: Negative for fever.   HENT: Negative for trouble swallowing.    Eyes: Negative for photophobia and visual disturbance.   Respiratory: Negative for shortness of breath.    Cardiovascular: Negative for chest pain, palpitations and leg swelling.   Gastrointestinal: Negative for abdominal pain, diarrhea, nausea and vomiting.   Genitourinary: Negative for decreased urine volume, dysuria, frequency and hematuria.   Skin: Negative for color change.   Neurological: Positive for headaches. Negative for dizziness, seizures, syncope, facial asymmetry, speech difficulty, weakness and numbness.       Physical Exam     Initial Vitals [05/21/22 1948]   BP Pulse Resp Temp SpO2   (!) 152/96 88 18 98.1 °F (36.7 °C) 99 %      MAP       --         Physical Exam    Nursing note and vitals reviewed.  Constitutional: No distress.   HENT:   Head: Normocephalic and atraumatic.   Eyes: EOM are normal. Pupils are equal, round, and reactive to light.   Neck: Neck  supple.   Cardiovascular: Normal rate, regular rhythm and normal heart sounds.   Pulmonary/Chest: Breath sounds normal. No respiratory distress.   Musculoskeletal:         General: Normal range of motion.      Cervical back: Neck supple.     Neurological: She is alert and oriented to person, place, and time. No cranial nerve deficit or sensory deficit. GCS score is 15. GCS eye subscore is 4. GCS verbal subscore is 5. GCS motor subscore is 6.   Skin: Skin is warm and dry. Capillary refill takes less than 2 seconds.         Medical Screening Exam   See Full Note    ED Course   Procedures  Labs Reviewed - No data to display       Imaging Results    None          Medications   sodium chloride 0.9% bolus 1,000 mL (1,000 mLs Intravenous New Bag 5/21/22 2022)   ketorolac injection 15 mg (15 mg Intravenous Given 5/21/22 2023)   promethazine (PHENERGAN) 25 mg in dextrose 5 % 50 mL IVPB (0 mg Intravenous Stopped 5/21/22 2044)                       Clinical Impression:   Final diagnoses:  [G43.009] Migraine without aura and without status migrainosus, not intractable (Primary)          ED Disposition Condition    Discharge Stable        ED Prescriptions     Medication Sig Dispense Start Date End Date Auth. Provider    promethazine (PHENERGAN) 25 MG tablet Take 1 tablet (25 mg total) by mouth every 4 (four) hours as needed for Nausea. 20 tablet 5/21/2022  TASHA Cespedes        Follow-up Information    None          TASHA Cespedes  05/21/22 2100

## 2022-05-22 NOTE — ED NOTES
PATIENT AMBULATORY TO ER 2 WITH C/O MIGRAINE SINCE EARLY THIS MORNING. PATIENT STATES THAT SHE HAS A HISTORY OF MIGRAINES AND SEES A NEUROLOGIST ABOUT THEM. PATIENT STATES THAT SHE HAS HAD SOME NAUSEA, BUT HAS NOT VOMITED AND THAT THE LIGHT IS NOT AFFECTING HER VISION AT PRESENT.

## 2022-05-22 NOTE — DISCHARGE INSTRUCTIONS
Follow up with your primary care provider if headache persists or returns. Drink plenty of water. Return to the ER as needed.

## 2022-08-17 ENCOUNTER — OFFICE VISIT (OUTPATIENT)
Dept: FAMILY MEDICINE | Facility: CLINIC | Age: 42
End: 2022-08-17
Payer: MEDICAID

## 2022-08-17 VITALS
WEIGHT: 203 LBS | SYSTOLIC BLOOD PRESSURE: 123 MMHG | HEART RATE: 96 BPM | HEIGHT: 66 IN | BODY MASS INDEX: 32.62 KG/M2 | DIASTOLIC BLOOD PRESSURE: 87 MMHG

## 2022-08-17 DIAGNOSIS — G43.101 MIGRAINE WITH AURA AND WITH STATUS MIGRAINOSUS, NOT INTRACTABLE: Primary | ICD-10-CM

## 2022-08-17 PROCEDURE — 3008F PR BODY MASS INDEX (BMI) DOCUMENTED: ICD-10-PCS | Mod: CPTII,,, | Performed by: FAMILY MEDICINE

## 2022-08-17 PROCEDURE — 1160F RVW MEDS BY RX/DR IN RCRD: CPT | Mod: CPTII,,, | Performed by: FAMILY MEDICINE

## 2022-08-17 PROCEDURE — 3074F SYST BP LT 130 MM HG: CPT | Mod: CPTII,,, | Performed by: FAMILY MEDICINE

## 2022-08-17 PROCEDURE — 1160F PR REVIEW ALL MEDS BY PRESCRIBER/CLIN PHARMACIST DOCUMENTED: ICD-10-PCS | Mod: CPTII,,, | Performed by: FAMILY MEDICINE

## 2022-08-17 PROCEDURE — 1159F MED LIST DOCD IN RCRD: CPT | Mod: CPTII,,, | Performed by: FAMILY MEDICINE

## 2022-08-17 PROCEDURE — 1159F PR MEDICATION LIST DOCUMENTED IN MEDICAL RECORD: ICD-10-PCS | Mod: CPTII,,, | Performed by: FAMILY MEDICINE

## 2022-08-17 PROCEDURE — 3079F DIAST BP 80-89 MM HG: CPT | Mod: CPTII,,, | Performed by: FAMILY MEDICINE

## 2022-08-17 PROCEDURE — 3079F PR MOST RECENT DIASTOLIC BLOOD PRESSURE 80-89 MM HG: ICD-10-PCS | Mod: CPTII,,, | Performed by: FAMILY MEDICINE

## 2022-08-17 PROCEDURE — 99213 OFFICE O/P EST LOW 20 MIN: CPT | Mod: ,,, | Performed by: FAMILY MEDICINE

## 2022-08-17 PROCEDURE — 99213 PR OFFICE/OUTPT VISIT, EST, LEVL III, 20-29 MIN: ICD-10-PCS | Mod: ,,, | Performed by: FAMILY MEDICINE

## 2022-08-17 PROCEDURE — 3074F PR MOST RECENT SYSTOLIC BLOOD PRESSURE < 130 MM HG: ICD-10-PCS | Mod: CPTII,,, | Performed by: FAMILY MEDICINE

## 2022-08-17 PROCEDURE — 3008F BODY MASS INDEX DOCD: CPT | Mod: CPTII,,, | Performed by: FAMILY MEDICINE

## 2022-08-17 RX ORDER — SUMATRIPTAN SUCCINATE 100 MG/1
100 TABLET ORAL
Qty: 9 TABLET | Refills: 0 | Status: SHIPPED | OUTPATIENT
Start: 2022-08-17 | End: 2023-02-14

## 2022-08-17 NOTE — PROGRESS NOTES
Everton Sims IV, DO  [facility]  [address] [place] [address]  Phone: [phone]      Subjective     Name: Sabiha Dunbar   Sex: female  YOB: 1980 (41 y.o.)  MRN: [id number]  Visit Date: 08/17/2022   Chief Complaint: Headache (Migraine. Started this a.m.)        HISTORY OF PRESENT ILLNESS:    Family history of migraines in mother.  Patient has had this happen before but has always used over the counter treatment methods including Excedrin, bc powder, and caffeine.  Patient endorses aura.  Considering family history and symptomology, will rx sumatriptan and instruct on its use.        PAST MEDICAL HISTORY:  Significant Diagnoses - Patient  has a past medical history of Abnormal Pap smear of cervix (2003).  Medications - Patient has a current medication list which includes the following long-term medication(s): sumatriptan.   Allergies - Patient is allergic to biaxin [clarithromycin].  Surgeries - Patient  has a past surgical history that includes Cervical biopsy w/ loop electrode excision; Cholecystectomy; Tubal ligation (2003); and Fallopian tuboplasty (Bilateral).  Family History - Patient Family history is unknown by patient.      SOCIAL HISTORY:  Tobacco - Patient  reports that she has never smoked. She has never used smokeless tobacco.   Alcohol - Patient  reports previous alcohol use.   Recreational Drugs - Patient  reports previous drug use.       Review of Systems   Constitutional: Negative for fatigue and fever.   HENT: Negative for nasal congestion and sore throat.    Eyes: Positive for visual disturbance.   Respiratory: Negative for cough and shortness of breath.    Cardiovascular: Negative for chest pain.   Gastrointestinal: Negative for abdominal pain, nausea and vomiting.   Genitourinary: Negative for dysuria.   Musculoskeletal: Negative for myalgias.   Integumentary:  Negative for rash.   Neurological: Positive for headaches. Negative for  "dizziness and weakness.          Past Medical History:   Diagnosis Date    Abnormal Pap smear of cervix 2003        Review of patient's allergies indicates:   Allergen Reactions    Biaxin [clarithromycin]         Past Surgical History:   Procedure Laterality Date    CERVICAL BIOPSY  W/ LOOP ELECTRODE EXCISION      CHOLECYSTECTOMY      FALLOPIAN TUBOPLASTY Bilateral     TUBAL LIGATION  2003        Family History   Family history unknown: Yes       Current Outpatient Medications   Medication Instructions    promethazine (PHENERGAN) 25 mg, Oral, Every 4 hours PRN    sumatriptan (IMITREX) 100 mg, Oral, Every 2 hours PRN        Objective     /87   Pulse 96   Ht 5' 6" (1.676 m)   Wt 92.1 kg (203 lb)   BMI 32.77 kg/m²     Physical Exam  Constitutional:       General: She is not in acute distress.     Appearance: Normal appearance. She is not ill-appearing.   HENT:      Head: Normocephalic and atraumatic.      Right Ear: External ear normal.      Left Ear: External ear normal.   Eyes:      Extraocular Movements: Extraocular movements intact.      Conjunctiva/sclera: Conjunctivae normal.   Cardiovascular:      Rate and Rhythm: Normal rate.      Heart sounds: No murmur heard.  Pulmonary:      Effort: Pulmonary effort is normal.   Musculoskeletal:      Cervical back: Normal range of motion.   Skin:     General: Skin is warm and dry.      Coloration: Skin is not jaundiced.      Findings: No rash.   Neurological:      Mental Status: She is alert.   Psychiatric:         Mood and Affect: Mood normal.          All recently obtained labs have been reviewed and discussed in detail with the patient.   Assessment     1. Migraine with aura and with status migrainosus, not intractable         Plan        Problem List Items Addressed This Visit    None     Visit Diagnoses     Migraine with aura and with status migrainosus, not intractable    -  Primary          No follow-ups on file.    Everton Sims, DO  [facility]

## 2022-11-11 ENCOUNTER — HOSPITAL ENCOUNTER (EMERGENCY)
Facility: HOSPITAL | Age: 42
Discharge: HOME | End: 2022-11-11
Payer: MEDICAID

## 2022-11-11 VITALS
WEIGHT: 195 LBS | OXYGEN SATURATION: 99 % | BODY MASS INDEX: 31.34 KG/M2 | DIASTOLIC BLOOD PRESSURE: 77 MMHG | RESPIRATION RATE: 18 BRPM | HEART RATE: 79 BPM | HEIGHT: 66 IN | TEMPERATURE: 98 F | SYSTOLIC BLOOD PRESSURE: 113 MMHG

## 2022-11-11 DIAGNOSIS — G43.909 MIGRAINE WITHOUT STATUS MIGRAINOSUS, NOT INTRACTABLE, UNSPECIFIED MIGRAINE TYPE: Primary | ICD-10-CM

## 2022-11-11 PROCEDURE — 99284 EMERGENCY DEPT VISIT MOD MDM: CPT

## 2022-11-11 PROCEDURE — 99284 PR EMERGENCY DEPT VISIT,LEVEL IV: ICD-10-PCS | Mod: ,,, | Performed by: NURSE PRACTITIONER

## 2022-11-11 PROCEDURE — 63600175 PHARM REV CODE 636 W HCPCS: Performed by: NURSE PRACTITIONER

## 2022-11-11 PROCEDURE — 99284 EMERGENCY DEPT VISIT MOD MDM: CPT | Mod: ,,, | Performed by: NURSE PRACTITIONER

## 2022-11-11 PROCEDURE — 96372 THER/PROPH/DIAG INJ SC/IM: CPT

## 2022-11-11 RX ORDER — KETOROLAC TROMETHAMINE 30 MG/ML
30 INJECTION, SOLUTION INTRAMUSCULAR; INTRAVENOUS
Status: COMPLETED | OUTPATIENT
Start: 2022-11-11 | End: 2022-11-11

## 2022-11-11 RX ORDER — PROMETHAZINE HYDROCHLORIDE 25 MG/ML
25 INJECTION, SOLUTION INTRAMUSCULAR; INTRAVENOUS
Status: COMPLETED | OUTPATIENT
Start: 2022-11-11 | End: 2022-11-11

## 2022-11-11 RX ADMIN — KETOROLAC TROMETHAMINE 30 MG: 30 INJECTION, SOLUTION INTRAMUSCULAR at 09:11

## 2022-11-11 RX ADMIN — PROMETHAZINE HYDROCHLORIDE 25 MG: 25 INJECTION INTRAMUSCULAR; INTRAVENOUS at 09:11

## 2022-11-12 NOTE — ED PROVIDER NOTES
Encounter Date: 11/11/2022       History     Chief Complaint   Patient presents with    Migraine     Presented with c/o migraine x 3 days. States has migraines regularly and usually treats at home with OTC meds. States not on preventative treatments at this time. States nausea with no vomiting. Denies visual disturbance or dizziness or focal weakness. States this is her typical migraine symptoms.    Review of patient's allergies indicates:   Allergen Reactions    Biaxin [clarithromycin]      Past Medical History:   Diagnosis Date    Abnormal Pap smear of cervix 2003    Migraine headache      Past Surgical History:   Procedure Laterality Date    CERVICAL BIOPSY  W/ LOOP ELECTRODE EXCISION      CHOLECYSTECTOMY      FALLOPIAN TUBOPLASTY Bilateral     TUBAL LIGATION  2003     Family History   Family history unknown: Yes     Social History     Tobacco Use    Smoking status: Never    Smokeless tobacco: Never   Substance Use Topics    Alcohol use: Yes     Alcohol/week: 2.0 standard drinks     Types: 2 Cans of beer per week     Comment: occasional social drink    Drug use: Never     Review of Systems   Constitutional:  Negative for fever.   HENT: Negative.     Eyes:  Positive for photophobia. Negative for visual disturbance.   Respiratory: Negative.     Cardiovascular: Negative.    Gastrointestinal:  Positive for nausea. Negative for vomiting.   Genitourinary: Negative.    Musculoskeletal: Negative.    Neurological:  Positive for headaches. Negative for dizziness, speech difficulty and weakness.   Psychiatric/Behavioral: Negative.       Physical Exam     Initial Vitals [11/11/22 2101]   BP Pulse Resp Temp SpO2   (!) 144/56 80 18 98 °F (36.7 °C) 98 %      MAP       --         Physical Exam    Nursing note and vitals reviewed.  Constitutional: She appears well-developed and well-nourished. No distress.   HENT:   Head: Normocephalic.   Right Ear: External ear normal.   Left Ear: External ear normal.   Nose: Nose normal.    Mouth/Throat: Oropharynx is clear and moist.   Eyes: Conjunctivae and EOM are normal. Pupils are equal, round, and reactive to light.   Neck: Neck supple.   Normal range of motion.  Cardiovascular:  Normal rate, regular rhythm, normal heart sounds and intact distal pulses.           Pulmonary/Chest: Breath sounds normal.   Abdominal: Abdomen is soft. Bowel sounds are normal.   Musculoskeletal:         General: No edema. Normal range of motion.      Cervical back: Normal range of motion and neck supple.     Neurological: She is alert and oriented to person, place, and time. She has normal strength. GCS score is 15. GCS eye subscore is 4. GCS verbal subscore is 5. GCS motor subscore is 6.   Skin: Skin is warm and dry. Capillary refill takes less than 2 seconds.   Psychiatric: She has a normal mood and affect. Her behavior is normal. Judgment and thought content normal.       Medical Screening Exam   See Full Note    ED Course   Procedures  Labs Reviewed - No data to display       Imaging Results    None          Medications   ketorolac injection 30 mg (30 mg Intramuscular Given 11/11/22 2133)   promethazine injection 25 mg (25 mg Intramuscular Given 11/11/22 2134)                       Clinical Impression:   Final diagnoses:  [G43.909] Migraine without status migrainosus, not intractable, unspecified migraine type (Primary)      ED Disposition Condition    Discharge Stable          ED Prescriptions    None       Follow-up Information       Follow up With Specialties Details Why Contact Info    Everton Sims IV, DO Family Medicine In 3 days  [address] [facility] [place] [address]  [phone]               Mirella Waters NP  11/11/22 2142

## 2022-11-12 NOTE — ED TRIAGE NOTES
Having a migraine headache started 3 days ago. Hx of migraines. Not the worst headache ever. States headaches started when she was young, before she was a teenager.

## 2022-11-12 NOTE — DISCHARGE INSTRUCTIONS
Rest at home. Drink plenty of liquids. Follow-up with your PCP to discuss treatment options. Return to the ED for new or worsening symptoms.

## 2022-11-15 ENCOUNTER — OFFICE VISIT (OUTPATIENT)
Dept: FAMILY MEDICINE | Facility: CLINIC | Age: 42
End: 2022-11-15
Payer: MEDICAID

## 2022-11-15 VITALS
HEART RATE: 80 BPM | WEIGHT: 195 LBS | DIASTOLIC BLOOD PRESSURE: 73 MMHG | HEIGHT: 66 IN | SYSTOLIC BLOOD PRESSURE: 115 MMHG | BODY MASS INDEX: 31.34 KG/M2

## 2022-11-15 DIAGNOSIS — M25.511 ACUTE PAIN OF RIGHT SHOULDER: Primary | ICD-10-CM

## 2022-11-15 PROCEDURE — 3008F PR BODY MASS INDEX (BMI) DOCUMENTED: ICD-10-PCS | Mod: CPTII,,, | Performed by: FAMILY MEDICINE

## 2022-11-15 PROCEDURE — 99212 PR OFFICE/OUTPT VISIT, EST, LEVL II, 10-19 MIN: ICD-10-PCS | Mod: ,,, | Performed by: FAMILY MEDICINE

## 2022-11-15 PROCEDURE — 3008F BODY MASS INDEX DOCD: CPT | Mod: CPTII,,, | Performed by: FAMILY MEDICINE

## 2022-11-15 PROCEDURE — 1159F MED LIST DOCD IN RCRD: CPT | Mod: CPTII,,, | Performed by: FAMILY MEDICINE

## 2022-11-15 PROCEDURE — 3078F DIAST BP <80 MM HG: CPT | Mod: CPTII,,, | Performed by: FAMILY MEDICINE

## 2022-11-15 PROCEDURE — 3078F PR MOST RECENT DIASTOLIC BLOOD PRESSURE < 80 MM HG: ICD-10-PCS | Mod: CPTII,,, | Performed by: FAMILY MEDICINE

## 2022-11-15 PROCEDURE — 3074F SYST BP LT 130 MM HG: CPT | Mod: CPTII,,, | Performed by: FAMILY MEDICINE

## 2022-11-15 PROCEDURE — 99212 OFFICE O/P EST SF 10 MIN: CPT | Mod: ,,, | Performed by: FAMILY MEDICINE

## 2022-11-15 PROCEDURE — 3074F PR MOST RECENT SYSTOLIC BLOOD PRESSURE < 130 MM HG: ICD-10-PCS | Mod: CPTII,,, | Performed by: FAMILY MEDICINE

## 2022-11-15 PROCEDURE — 1159F PR MEDICATION LIST DOCUMENTED IN MEDICAL RECORD: ICD-10-PCS | Mod: CPTII,,, | Performed by: FAMILY MEDICINE

## 2022-11-15 NOTE — PROGRESS NOTES
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  Initially we tried sumatriptan 100 mg.  Patient states that this did offer some relief but side effects were too much for her.  I have given her some samples of Ubrelvy in the office to be tested for efficacy and patient is to let me know if they work for her if they do I will try to get the insurance to pay for this and or since patient has more than 1 of these every week she should probably be on suppressive therapy.  Otherwise patient is complaining about pain in her right shoulder.  It is located at the base of the shoulder blade.  It pinpoint.  It has been going on for years it is related to position.  Patient describes it as a white hot poker.  It is aggravated by positional changes and relieved by other positional changes.  It does not radiate.  I have suggested physical therapy and patient is in agreement and I will place a order for ambulatory referral for evaluation and treatment today.  Of note, patient was concerned about sciatica.  Negative straight leg raise in the office today.      This document was dictated using mmodal fluency direct.  It is subject to dictation errors.    PAST MEDICAL HISTORY:  Significant Diagnoses - Patient  has a past medical history of Abnormal Pap smear of cervix (2003) and Migraine headache.  Medications - Patient has a current medication list which includes the following long-term medication(s): sumatriptan.   Allergies - Patient is allergic to  "clarithromycin.  Surgeries - Patient  has a past surgical history that includes Cervical biopsy w/ loop electrode excision; Cholecystectomy; Tubal ligation (2003); and Fallopian tuboplasty (Bilateral).  Family History - Patient Family history is unknown by patient.      SOCIAL HISTORY:  Tobacco - Patient  reports that she has never smoked. She has never used smokeless tobacco.   Alcohol - Patient  reports current alcohol use of about 2.0 standard drinks per week.   Recreational Drugs - Patient  reports no history of drug use.       Review of Systems   Constitutional:  Negative for fatigue and fever.   HENT:  Negative for nasal congestion and sore throat.    Respiratory:  Negative for cough and shortness of breath.    Cardiovascular:  Negative for chest pain.   Gastrointestinal:  Negative for abdominal pain, nausea and vomiting.   Genitourinary:  Negative for dysuria.   Musculoskeletal:  Negative for myalgias.   Integumentary:  Negative for rash.   Neurological:  Positive for headaches. Negative for dizziness and weakness.        Past Medical History:   Diagnosis Date    Abnormal Pap smear of cervix 2003    Migraine headache         Review of patient's allergies indicates:   Allergen Reactions    Clarithromycin Rash and Palpitations     Other reaction(s): Hives / Skin Rash        Past Surgical History:   Procedure Laterality Date    CERVICAL BIOPSY  W/ LOOP ELECTRODE EXCISION      CHOLECYSTECTOMY      FALLOPIAN TUBOPLASTY Bilateral     TUBAL LIGATION  2003        Family History   Family history unknown: Yes       Current Outpatient Medications   Medication Instructions    promethazine (PHENERGAN) 25 mg, Oral, Every 4 hours PRN    sumatriptan (IMITREX) 100 mg, Oral, Every 2 hours PRN        Objective     /73   Pulse 80   Ht 5' 6" (1.676 m)   Wt 88.5 kg (195 lb)   LMP 11/10/2022 (Exact Date)   BMI 31.47 kg/m²     Physical Exam  Constitutional:       General: She is not in acute distress.     " Appearance: Normal appearance. She is not ill-appearing.   HENT:      Head: Normocephalic and atraumatic.      Right Ear: External ear normal.      Left Ear: External ear normal.   Eyes:      Extraocular Movements: Extraocular movements intact.      Conjunctiva/sclera: Conjunctivae normal.   Cardiovascular:      Rate and Rhythm: Normal rate.      Heart sounds: No murmur heard.  Pulmonary:      Effort: Pulmonary effort is normal.   Musculoskeletal:      Cervical back: Normal range of motion.   Skin:     General: Skin is warm and dry.      Coloration: Skin is not jaundiced.      Findings: No rash.   Neurological:      Mental Status: She is alert.   Psychiatric:         Mood and Affect: Mood normal.        All recently obtained labs have been reviewed and discussed in detail with the patient.   Assessment     1. Acute pain of right shoulder         Plan        Problem List Items Addressed This Visit    None  Visit Diagnoses       Acute pain of right shoulder    -  Primary    Relevant Orders    Ambulatory referral/consult to Physical/Occupational Therapy            No follow-ups on file.    Patient advised that is symptoms worsen, they should call or report directly to local emergency department.    Everton Sims,   [facility]

## 2022-11-30 ENCOUNTER — CLINICAL SUPPORT (OUTPATIENT)
Dept: REHABILITATION | Facility: HOSPITAL | Age: 42
End: 2022-11-30
Payer: MEDICAID

## 2022-11-30 DIAGNOSIS — M89.8X1 SHOULDER BLADE PAIN: ICD-10-CM

## 2022-11-30 DIAGNOSIS — M25.511 ACUTE PAIN OF RIGHT SHOULDER: ICD-10-CM

## 2022-11-30 DIAGNOSIS — S46.811A STRAIN OF RIGHT SUBSCAPULARIS MUSCLE, INITIAL ENCOUNTER: ICD-10-CM

## 2022-11-30 PROCEDURE — 97161 PT EVAL LOW COMPLEX 20 MIN: CPT

## 2022-11-30 NOTE — PLAN OF CARE
[facility]  Physical Therapy Initial Evaluation    Name: Sabiha Dunbar  Clinic Number: [id number]    Therapy Diagnosis:   Encounter Diagnoses   Name Primary?    Acute pain of right shoulder     Strain of right subscapularis muscle, initial encounter     Shoulder blade pain       Physician: Everton Sims IV, DO    Physician Orders: PT Eval and Treat  Medical Diagnosis from Referral: Acute pain of right shoulder [M25.511]  Evaluation Date: 11/30/2022  Authorization Period Expiration: 2023  Plan of Care Expiration: 12/31/2022     Visit # / Visits authorized: 1 / 1 (eval)    FOTO: Visit #1 - Scored : 1 / 3     PRECAUTIONS: Standard Precautions     MD Follow-up: TBD/2022    Time In: 8:00 AM  Time Out: 8:35 AM  Total Appointment Time (timed & untimed codes): 35 minutes    SUBJECTIVE     Date of onset: ~ 2.5 years     History of current condition - Sabiha is a 42 y.o. female whom reports right shoulder blade pain, the pain is related to positional movement. 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 When she starts to work it being to irritates  her which will exacerbate her pain which will last all day.   Sabiha's current exercise regiment includes: none.  Seeking Physical Therapy for right shoulder blade pain.    CISCO: None    Falls: None    Physician Instructions (per patient): N/A    Other concerns: None    Imaging: No updated imaging for this episode of care     Prior Therapy: N/A  Social History: Pt lives with their family  Living Environment: none  ADLs unable to complete: none  Gym/Home Equipment: none  Occupation: Pt is Seamstress   Prior Level of Function: Independent with all ADLs  Current Level of Function: 70% of PLOF    Pain:  Current 2 /10, worst 7 /10, best 4 /10   Location: right shoulder blade around T2- T4  Description: Burning and stabbbing  Pain at rest: yes (after work) (cuff involvement)  Mechanical Pain: yes (labral)  Aggravating Factors: working, leaning  forward  Easing Factors: resting    Dominant Extremity: Right    _______________________________________________________  Medical History:   Past Medical History:   Diagnosis Date    Abnormal Pap smear of cervix 2003    Migraine headache        Surgical History:    has a past surgical history that includes Cervical biopsy w/ loop electrode excision; Cholecystectomy; Tubal ligation (2003); and Fallopian tuboplasty (Bilateral).    Medications:   has a current medication list which includes the following prescription(s): promethazine and sumatriptan.    Allergies:   Review of patient's allergies indicates:   Allergen Reactions    Clarithromycin Rash and Palpitations     Other reaction(s): Hives / Skin Rash        OBJECTIVE     RANGE OF MOTION:    Cervical Right   (spine) Left    Goal   Cervical Flexion (60) wfl  45  Initial: x   Cervical Extension (90) wfl  45  Initial: x   Cervical Side Bending (45) wfl wfl 45  Initial: x   Cervical Rotation (75) wfl wfl 60  Initial: x     Shoulder AROM/PROM Right   Left   Goal   Forward Flexion (180) wfl wfl 180  Initial:    ER at 90 degrees (90) 70 P! (Shoulder blade) wfl 90  Initial: 90 no pain   ER at 0 degrees (45)  wfl wfl 45  Initial:    Functional ER (C7) wfl wfl C7  Initial:    Functional IR (T10) wfl wfl T10  Initial:      STRENGTH:    U/E MMT Right   Goal   Shoulder Flexion 4+/5 5/5 B   Shoulder Abduction 4+/5 5/5 B   Shoulder IR 4+/5 5/5 B   Shoulder ER 4+/5 5/5 B   Elbow Flexion  4+/5 5/5 B   Elbow Extension 4+/5 5/5 B     U/E MMT Left   Goal   Shoulder Flexion 4+/5 5/5 B   Shoulder Abduction 4+/5 5/5 B   Shoulder IR 4-/5 5/5 B   Shoulder ER 4-/5 5/5 B   Elbow Flexion  4+/5 5/5 B   Elbow Extension 4+/5 5/5 B     MUSCLE LENGTH:     Muscle Tested  Right   Left    Goal   Upper Trapezius  decreased normal Normal B    Levator Scapulae  normal normal Normal B   Sternocleidomastoid normal normal Normal B   Scalenes  normal normal Normal B    Pectoralis Minor  decreased normal  Normal B   Pectoralis Major normal normal Normal B     JOINT MOBILITY:     Joint Motion Tested Right   Left    Goal   GHJ Posterior Glide Normal Normal Normal B   GHJ Inferior Glide Normal Normal Normal B   GHJ Lateral Glide Normal Normal Normal B   GHJ Anterior Glide Normal Normal Normal B     SPECIAL TESTS: if left blank, was not tested     Right   Left    Goal   Empty Can Negative Negative Negative B    Full Can Negative Negative Negative B    Neers Negative Negative Negative B    Herrera Richard Negative Negative Negative B    Drop Arm Negative Negative Negative B    Lag Sign Positive Negative Negative B    Lift Off Positive Negative Negative B    Belly Press Positive Negative Negative B    Active Compression (o'brians) Negative Negative Negative B    Biceps Load Negative Negative Negative B    External rotation + Supination      Sulcus Sign Negative Negative Negative B        Sensation:  Sensation is intact to light touch    Palpation: Increased tone and tenderness noted with palpation to: right rhomboids, right thoracic paraspinals    Posture:  Pt presents with postural abnormalities which include: forward head and rounded shoulders    Gait: N/A    Movement Analysis: N/A    Balance: N/A      FUNCTION:     CMS Impairment/Limitation/Restriction for FOTO shoulder Survey    Therapist reviewed FOTO scores for Sabiha Dunbar on 11/30/2022.   FOTO documents entered into [employer] - see Media section.    Limitation Score: 80%         TREATMENT     Total Treatment time separate from Evaluation:  minutes    Sabiha received therapeutic exercises to develop strength, endurance, ROM, flexibility, and posture for minutes including: x = exercises performed     TherEx 11/30/2022    Scapular retractions x    Shoulder ER IR isometric walk out x                Plan for Next Visit:        PATIENT EDUCATION AND HOME EXERCISES       Education/Self-Care provided: (included in treatment) minutes   Patient educated on the impairments  noted above and the effects of physical therapy intervention to improve overall condition and QOL.   Patient was educated on all the above exercise prior/during/after for proper posture, positioning, and execution for safe performance with home exercise program.  Exercise Prescription:     Written Home Exercises Provided: yes. Prefers: Printed Copies  Exercises were reviewed and Sabiha was able to demonstrate them prior to the end of the session.  Sabiha demonstrated good understanding of the education provided.     See EMR under Patient Instructions for exercises provided during therapy sessions.    ASSESSMENT     Sabiha is a 42 y.o. female referred to outpatient Physical Therapy with a medical diagnosis of Acute pain of right shoulder .  Sabiha presents with clinical signs and symptoms that support this diagnosis with decreased shoulder girdle ROM, decreased scapular and shoulder strength, Glenohumeral joint hypomobility, and impaired functional mobility.The above impairments will be addressed through manual therapy techniques, therapeutic exercises, functional training, and modalities as necessary. Patient was treated and educated on exercises for home program, progression of therapy, and benefits of therapy to achieve full functional mobility. Pt prognosis is Good.   Pt will benefit from skilled outpatient Physical Therapy to address the deficits stated above and in the chart below, provide pt/family education, and to maximize pt's level of independence.     Plan of care discussed with patient: Yes  Pt's spiritual, cultural and educational needs considered and patient is agreeable to the plan of care and goals as stated below:     Anticipated Barriers for therapy: co-morbidities, sedentary lifestyle, and chronicity of condition    Medical Necessity is demonstrated by the following  History  Co-morbidities and personal factors that may impact the plan of care Co-morbidities:     Past Medical History:  "  Diagnosis Date    Abnormal Pap smear of cervix 2003    Migraine headache        Personal Factors:   no deficits     low   Examination  Body Structures and Functions, activity limitations and participation restrictions that may impact the plan of care Body Regions:   upper extremities    Body Systems:    ROM  strength  motor control    Participation Restrictions:   See above in "Current Level of Function"     Activity limitations:   Learning and applying knowledge  no deficits    General Tasks and Commands  no deficits    Communication  no deficits    Mobility  no deficits    Self care  no deficits    Domestic Life  no deficits    Interactions/Relationships  no deficits    Life Areas  no deficits    Community and Social Life  community life  recreation and leisure         low   Clinical Presentation stable and uncomplicated low   Decision Making/ Complexity Score: low       GOALS:  SHORT TERM GOALS: 2 weeks,  Progress   Recent signs and systems trend is improving in order to progress towards LTG's.    Patient will be independent with HEP in order to further progress and return to maximal function.    Pain rating at Worst: 5/10 in order to progress towards increased independence with activity.    Patient will be able to correct postural deviations in sitting and standing, to decrease pain and promote postural awareness for injury prevention.       LONG TERM GOALS: 4 weeks,  Progress   Patient will return to normal ADL, recreational, and work related activities with less pain and limitation.     Patient will improve AROM to stated goals in order to return to maximal functional potential.     Patient will improve Strength to stated goals of appropriate musculature in order to improve functional independence.     Pain Rating at Best: 1/10 to improve Quality of Life.     Patient will meet predicted functional outcome (FOTO) score: 5% to increase self-worth & perceived functional ability.      PLAN   Medicard " Requirements    Plan of care dates:      Plan of care Certification: 11/30/2022 to 12/31/2022    CPT codes and number of unites needed per visit:    40090 (theraputic exercise): x3 pe visits pe week for 2 vists for 4 weeks for a total of 24 units.   21736 (Neuromuscular Re-Education):  x1 pe visits pe week for 2 vists for 4 weeks for a total of 8 units.   38093 (manual therapy):  x1 pe visits pe week for 2 vists for 4 weeks for a total of 8 units.    When Symptoms started: Chronic (2 years ago)    Discharge plan : will disachrage when goals met or functional progress stops    Last face to face with MD: 11/15/2022      Outpatient Physical Therapy 2 times weekly for 4 weeks to include any combination of the following interventions: virtual visits, dry needling, modalities, electrical stimulation (IFC, Pre-Mod, Attended with Functional Dry Needling), Electrical Stimulation , Manual Therapy, Moist Heat/ Ice, Neuromuscular Re-ed, Patient Education, Self Care, Therapeutic Activities, Therapeutic Exercise, Functional Training, and Therapeutic Activites     Thank you for this referral.    Ramona Martines, PT      I CERTIFY THE NEED FOR THESE SERVICES FURNISHED UNDER THIS PLAN OF TREATMENT AND WHILE UNDER MY CARE   Physician's comments:     Physician's Signature: ___________________________________________________

## 2022-12-06 NOTE — PROGRESS NOTES
"  Physical Therapy Treatment Note     Name: Sabiha Dunbar  [facility] Number: [id number]    Therapy Diagnosis: No diagnosis found.  Physician: Everton Sims IV, DO    Visit Date: 12/6/2022    Physician: Everton Sims IV, DO    Physician Orders: PT Eval and Treat  Medical Diagnosis from Referral: Acute pain of right shoulder [M25.511]  Evaluation Date: 11/30/2022  Authorization Period Expiration: 2023  Plan of Care Expiration: 12/31/2022   Visit # / Visits authorized: 2/9   PTA Visit #: 1    Time In: 0759 (patient's treatment started before patient was checked in)   Time Out: 0833  Total Billable Time: 34 minutes    Precautions: Standard    Subjective     Pt reports: "it always hurts, I started back working too and it is hurting with that too"   She was compliant with home exercise program.    Pain: 5/10  Location: right shoulder      Objective     Sabiha received therapeutic exercises to develop strength, ROM, flexibility, and posture for 34 minutes including:  UEB x 6 min  Scap retract 3 x 10  Shoulder flexion with cane 2 x 10  Shoulder ER with cane 2 x 10  Prone rows 2 x 10  Prone T 2 x 10  Prone Y 2 x 10  Shoulder extension with RTB 2 x 10   Shoulder rows with RTB 2 x 10   Bilateral shoulder ER with RTB 2 x 10  Across body stretch 3 x 20 sec holds     Home Exercises Provided and Patient Education Provided     Education provided: no new exercise given today     Written Home Exercises Provided: Patient instructed to cont prior HEP.  Exercises were reviewed and Sabiha was able to demonstrate them prior to the end of the session.  Sabiha demonstrated good  understanding of the education provided.     See EMR under Patient Instructions for exercises provided prior visit.    Assessment     Patient had no new complaints following therapy today. Patient reported feeling better following treatment today.   Sabiha Is progressing well towards her goals.   Pt prognosis is Good.     Pt will continue to benefit " from skilled outpatient physical therapy to address the deficits listed in the problem list box on initial evaluation, provide pt/family education and to maximize pt's level of independence in the home and community environment.     Pt's spiritual, cultural and educational needs considered and pt agreeable to plan of care and goals.     Anticipated barriers to physical therapy: chronicity of condition     Goals:    SHORT TERM GOALS: 2 weeks,  Progress   Recent signs and systems trend is improving in order to progress towards LTG's.     Patient will be independent with HEP in order to further progress and return to maximal function.     Pain rating at Worst: 5/10 in order to progress towards increased independence with activity.     Patient will be able to correct postural deviations in sitting and standing, to decrease pain and promote postural awareness for injury prevention.         LONG TERM GOALS: 4 weeks,  Progress   Patient will return to normal ADL, recreational, and work related activities with less pain and limitation.      Patient will improve AROM to stated goals in order to return to maximal functional potential.      Patient will improve Strength to stated goals of appropriate musculature in order to improve functional independence.      Pain Rating at Best: 1/10 to improve Quality of Life.      Patient will meet predicted functional outcome (FOTO) score: 5% to increase self-worth & perceived functional ability.        Plan     Continue with appropriate POC    Yasmeen Zee, PTA  12/6/2022

## 2022-12-07 ENCOUNTER — CLINICAL SUPPORT (OUTPATIENT)
Dept: REHABILITATION | Facility: HOSPITAL | Age: 42
End: 2022-12-07
Payer: MEDICAID

## 2022-12-07 DIAGNOSIS — S46.811A STRAIN OF RIGHT SUBSCAPULARIS MUSCLE, INITIAL ENCOUNTER: Primary | ICD-10-CM

## 2022-12-07 DIAGNOSIS — M89.8X1 SHOULDER BLADE PAIN: ICD-10-CM

## 2022-12-07 PROCEDURE — 97110 THERAPEUTIC EXERCISES: CPT | Mod: CQ

## 2022-12-29 ENCOUNTER — PATIENT MESSAGE (OUTPATIENT)
Dept: FAMILY MEDICINE | Facility: CLINIC | Age: 42
End: 2022-12-29
Payer: MEDICAID

## 2022-12-29 DIAGNOSIS — G43.101 MIGRAINE WITH AURA AND WITH STATUS MIGRAINOSUS, NOT INTRACTABLE: Primary | ICD-10-CM

## 2022-12-29 RX ORDER — UBROGEPANT 50 MG/1
50 TABLET ORAL ONCE AS NEEDED
Qty: 8 TABLET | Refills: 2 | Status: SHIPPED | OUTPATIENT
Start: 2022-12-29 | End: 2022-12-29

## 2022-12-29 NOTE — TELEPHONE ENCOUNTER
Considering we have use sumatriptan in the past and side effects were undesirable.  I am happy to send in the prescription for you briefly.  I am hoping that the insurance will pay for it since you did fail treatment on a different medication.

## 2023-01-23 ENCOUNTER — OFFICE VISIT (OUTPATIENT)
Dept: FAMILY MEDICINE | Facility: CLINIC | Age: 43
End: 2023-01-23
Payer: MEDICAID

## 2023-01-23 VITALS
WEIGHT: 195 LBS | SYSTOLIC BLOOD PRESSURE: 125 MMHG | DIASTOLIC BLOOD PRESSURE: 80 MMHG | BODY MASS INDEX: 31.34 KG/M2 | HEIGHT: 66 IN | HEART RATE: 92 BPM

## 2023-01-23 DIAGNOSIS — J03.90 TONSILLITIS: Primary | ICD-10-CM

## 2023-01-23 PROCEDURE — 3079F PR MOST RECENT DIASTOLIC BLOOD PRESSURE 80-89 MM HG: ICD-10-PCS | Mod: CPTII,,, | Performed by: NURSE PRACTITIONER

## 2023-01-23 PROCEDURE — 3074F SYST BP LT 130 MM HG: CPT | Mod: CPTII,,, | Performed by: NURSE PRACTITIONER

## 2023-01-23 PROCEDURE — 1160F PR REVIEW ALL MEDS BY PRESCRIBER/CLIN PHARMACIST DOCUMENTED: ICD-10-PCS | Mod: CPTII,,, | Performed by: NURSE PRACTITIONER

## 2023-01-23 PROCEDURE — 3079F DIAST BP 80-89 MM HG: CPT | Mod: CPTII,,, | Performed by: NURSE PRACTITIONER

## 2023-01-23 PROCEDURE — 1159F PR MEDICATION LIST DOCUMENTED IN MEDICAL RECORD: ICD-10-PCS | Mod: CPTII,,, | Performed by: NURSE PRACTITIONER

## 2023-01-23 PROCEDURE — 99213 OFFICE O/P EST LOW 20 MIN: CPT | Mod: ,,, | Performed by: NURSE PRACTITIONER

## 2023-01-23 PROCEDURE — 3008F BODY MASS INDEX DOCD: CPT | Mod: CPTII,,, | Performed by: NURSE PRACTITIONER

## 2023-01-23 PROCEDURE — 1160F RVW MEDS BY RX/DR IN RCRD: CPT | Mod: CPTII,,, | Performed by: NURSE PRACTITIONER

## 2023-01-23 PROCEDURE — 3008F PR BODY MASS INDEX (BMI) DOCUMENTED: ICD-10-PCS | Mod: CPTII,,, | Performed by: NURSE PRACTITIONER

## 2023-01-23 PROCEDURE — 3074F PR MOST RECENT SYSTOLIC BLOOD PRESSURE < 130 MM HG: ICD-10-PCS | Mod: CPTII,,, | Performed by: NURSE PRACTITIONER

## 2023-01-23 PROCEDURE — 99213 PR OFFICE/OUTPT VISIT, EST, LEVL III, 20-29 MIN: ICD-10-PCS | Mod: ,,, | Performed by: NURSE PRACTITIONER

## 2023-01-23 PROCEDURE — 1159F MED LIST DOCD IN RCRD: CPT | Mod: CPTII,,, | Performed by: NURSE PRACTITIONER

## 2023-01-23 RX ORDER — UBROGEPANT 50 MG/1
50 TABLET ORAL DAILY PRN
COMMUNITY
Start: 2023-01-06 | End: 2023-07-05

## 2023-01-23 RX ORDER — AMOXICILLIN 500 MG/1
500 CAPSULE ORAL EVERY 12 HOURS
Qty: 14 CAPSULE | Refills: 0 | Status: SHIPPED | OUTPATIENT
Start: 2023-01-23 | End: 2023-01-30

## 2023-01-23 NOTE — PROGRESS NOTES
"Subjective:       Patient ID: Sabiha Dunbar is a 42 y.o. female.    Chief Complaint: Sore Throat (Sore throat x 2 weeks)    HPI    Patient presents c/o sore throat x 2 weeks    Says that throat discomfort will seem to ease slightly then recur  Denies further complaint    /80   Pulse 92   Ht 5' 6" (1.676 m)   Wt 88.5 kg (195 lb)   BMI 31.47 kg/m²     Medication List with Changes/Refills   New Medications    AMOXICILLIN (AMOXIL) 500 MG CAPSULE    Take 1 capsule (500 mg total) by mouth every 12 (twelve) hours. for 7 days   Current Medications    PROMETHAZINE (PHENERGAN) 25 MG TABLET    Take 1 tablet (25 mg total) by mouth every 4 (four) hours as needed for Nausea.    SUMATRIPTAN (IMITREX) 100 MG TABLET    Take 1 tablet (100 mg total) by mouth every 2 (two) hours as needed for Migraine.    UBRELVY 50 MG TABLET    Take 50 mg by mouth daily as needed.       Review of Systems   Constitutional:  Negative for chills, fatigue and fever.   HENT:  Positive for sore throat. Negative for trouble swallowing.    Respiratory:  Negative for cough.    Cardiovascular:  Negative for chest pain.   Neurological:  Negative for headaches.       Objective:      Physical Exam  Vitals and nursing note reviewed.   Constitutional:       General: She is not in acute distress.     Appearance: Normal appearance.   HENT:      Head: Normocephalic.      Right Ear: Tympanic membrane, ear canal and external ear normal.      Left Ear: Tympanic membrane, ear canal and external ear normal.      Nose: Nose normal.      Mouth/Throat:      Mouth: Mucous membranes are moist.      Pharynx: Posterior oropharyngeal erythema present. No oropharyngeal exudate.   Eyes:      Conjunctiva/sclera: Conjunctivae normal.   Neck:      Thyroid: No thyromegaly.      Trachea: Trachea normal.   Cardiovascular:      Rate and Rhythm: Normal rate and regular rhythm.      Pulses: Normal pulses.      Heart sounds: Normal heart sounds.   Pulmonary:      Effort: Pulmonary " effort is normal. No respiratory distress.      Breath sounds: Normal breath sounds.   Musculoskeletal:      Cervical back: Neck supple. Tenderness present.   Lymphadenopathy:      Cervical: Cervical adenopathy present.   Skin:     General: Skin is warm and dry.   Neurological:      General: No focal deficit present.      Mental Status: She is alert and oriented to person, place, and time.   Psychiatric:         Mood and Affect: Mood normal.         Behavior: Behavior normal.       Assessment:       1. Tonsillitis        Plan:       Patient Instructions   Medication as prescribed along with supportive care measures    Follow up if symptoms persist/worsen   Tonsillitis  -     amoxicillin (AMOXIL) 500 MG capsule; Take 1 capsule (500 mg total) by mouth every 12 (twelve) hours. for 7 days  Dispense: 14 capsule; Refill: 0

## 2023-01-23 NOTE — PATIENT INSTRUCTIONS
Medication as prescribed along with supportive care measures    Follow up if symptoms persist/worsen

## 2023-02-14 ENCOUNTER — OFFICE VISIT (OUTPATIENT)
Dept: FAMILY MEDICINE | Facility: CLINIC | Age: 43
End: 2023-02-14
Payer: MEDICAID

## 2023-02-14 ENCOUNTER — APPOINTMENT (OUTPATIENT)
Dept: RADIOLOGY | Facility: CLINIC | Age: 43
End: 2023-02-14
Attending: FAMILY MEDICINE
Payer: MEDICAID

## 2023-02-14 VITALS
HEIGHT: 66 IN | DIASTOLIC BLOOD PRESSURE: 80 MMHG | HEART RATE: 78 BPM | SYSTOLIC BLOOD PRESSURE: 119 MMHG | WEIGHT: 202 LBS | BODY MASS INDEX: 32.47 KG/M2

## 2023-02-14 DIAGNOSIS — M62.838 MUSCLE SPASM: Primary | ICD-10-CM

## 2023-02-14 DIAGNOSIS — M54.41 ACUTE MIDLINE LOW BACK PAIN WITH RIGHT-SIDED SCIATICA: ICD-10-CM

## 2023-02-14 PROCEDURE — 3079F PR MOST RECENT DIASTOLIC BLOOD PRESSURE 80-89 MM HG: ICD-10-PCS | Mod: CPTII,,, | Performed by: FAMILY MEDICINE

## 2023-02-14 PROCEDURE — 1159F MED LIST DOCD IN RCRD: CPT | Mod: CPTII,,, | Performed by: FAMILY MEDICINE

## 2023-02-14 PROCEDURE — 3074F SYST BP LT 130 MM HG: CPT | Mod: CPTII,,, | Performed by: FAMILY MEDICINE

## 2023-02-14 PROCEDURE — 3074F PR MOST RECENT SYSTOLIC BLOOD PRESSURE < 130 MM HG: ICD-10-PCS | Mod: CPTII,,, | Performed by: FAMILY MEDICINE

## 2023-02-14 PROCEDURE — 72110 X-RAY EXAM L-2 SPINE 4/>VWS: CPT | Mod: TC,RHCUB,FY | Performed by: FAMILY MEDICINE

## 2023-02-14 PROCEDURE — 96372 THER/PROPH/DIAG INJ SC/IM: CPT | Mod: ,,, | Performed by: FAMILY MEDICINE

## 2023-02-14 PROCEDURE — 99214 PR OFFICE/OUTPT VISIT, EST, LEVL IV, 30-39 MIN: ICD-10-PCS | Mod: 25,,, | Performed by: FAMILY MEDICINE

## 2023-02-14 PROCEDURE — 3008F PR BODY MASS INDEX (BMI) DOCUMENTED: ICD-10-PCS | Mod: CPTII,,, | Performed by: FAMILY MEDICINE

## 2023-02-14 PROCEDURE — 1159F PR MEDICATION LIST DOCUMENTED IN MEDICAL RECORD: ICD-10-PCS | Mod: CPTII,,, | Performed by: FAMILY MEDICINE

## 2023-02-14 PROCEDURE — 3079F DIAST BP 80-89 MM HG: CPT | Mod: CPTII,,, | Performed by: FAMILY MEDICINE

## 2023-02-14 PROCEDURE — 96372 PR INJECTION,THERAP/PROPH/DIAG2ST, IM OR SUBCUT: ICD-10-PCS | Mod: ,,, | Performed by: FAMILY MEDICINE

## 2023-02-14 PROCEDURE — 3008F BODY MASS INDEX DOCD: CPT | Mod: CPTII,,, | Performed by: FAMILY MEDICINE

## 2023-02-14 PROCEDURE — 99214 OFFICE O/P EST MOD 30 MIN: CPT | Mod: 25,,, | Performed by: FAMILY MEDICINE

## 2023-02-14 RX ORDER — KETOROLAC TROMETHAMINE 10 MG/1
10 TABLET, FILM COATED ORAL EVERY 6 HOURS
Qty: 20 TABLET | Refills: 0 | Status: SHIPPED | OUTPATIENT
Start: 2023-02-14 | End: 2023-02-19

## 2023-02-14 RX ORDER — METHOCARBAMOL 500 MG/1
500 TABLET, FILM COATED ORAL 4 TIMES DAILY
Qty: 40 TABLET | Refills: 0 | Status: SHIPPED | OUTPATIENT
Start: 2023-02-14 | End: 2023-02-24

## 2023-02-14 RX ORDER — KETOROLAC TROMETHAMINE 30 MG/ML
30 INJECTION, SOLUTION INTRAMUSCULAR; INTRAVENOUS
Status: COMPLETED | OUTPATIENT
Start: 2023-02-14 | End: 2023-02-14

## 2023-02-14 RX ADMIN — KETOROLAC TROMETHAMINE 30 MG: 30 INJECTION, SOLUTION INTRAMUSCULAR; INTRAVENOUS at 10:02

## 2023-02-14 NOTE — ASSESSMENT & PLAN NOTE
Acute low back pain right-sided sciatica.    Concern for disc propulsion versus muscle spasm.    Patient states that positionally speaking as she was a certain direction she gets a sharp pain.    Will prescribe Robaxin 500 mg p.o. q.8h p.r.n. muscle spasm

## 2023-02-14 NOTE — PROGRESS NOTES
"              Everton Sims IV, DO  [facility] [place]  [address], [place] [address]  Phone: [phone]      Subjective     Name: Sabiha Dunbar   Sex: female  YOB: 1980 (42 y.o.)  MRN: [id number]  Visit Date: 02/14/2023   Chief Complaint: Back Pain (Bent over to  a box and back went out., pain and legs wouldn't work )        HISTORY OF PRESENT ILLNESS:    Chief Complaint   Patient presents with    Back Pain     Bent over to  a box and back went out., pain and legs wouldn't work        Back Pain  This is a new problem. The current episode started yesterday. The problem occurs constantly. The problem has been waxing and waning since onset. The pain is present in the sacro-iliac. The quality of the pain is described as shooting and stabbing. The pain is at a severity of 7/10. The pain is severe. The pain is The same all the time. The symptoms are aggravated by bending, position, lying down, sitting and twisting. Stiffness is present All day. Associated symptoms include numbness. Pertinent negatives include no abdominal pain, bladder incontinence, bowel incontinence, chest pain, dysuria, fever, headaches, leg pain, paresis, paresthesias, pelvic pain, perianal numbness, tingling, weakness or weight loss.   See tests that keep you healthy and the problem oriented documentation below.    All of your core healthy metrics are met.      Portions of this note may have been created with voice recognition software. Occasional "wrong-word" or "sound-a-like" substitutions may have occurred due to the inherent limitations of voice recognition software. Please, read the note carefully and recognize, using context, where substitutions have occurred.     PAST MEDICAL HISTORY:  Significant Diagnoses - Patient  has a past medical history of Abnormal Pap smear of cervix (2003) and Migraine headache.  Medications - Patient is not on any long-term medications.   Allergies - Patient is " "allergic to clarithromycin.  Surgeries - Patient  has a past surgical history that includes Cervical biopsy w/ loop electrode excision; Cholecystectomy; Tubal ligation (2003); and Fallopian tuboplasty (Bilateral).  Family History - Patient Family history is unknown by patient.      SOCIAL HISTORY:  Tobacco - Patient  reports that she has never smoked. She has never used smokeless tobacco.   Alcohol - Patient  reports current alcohol use of about 2.0 standard drinks per week.   Recreational Drugs - Patient  reports no history of drug use.       Review of Systems   Constitutional:  Negative for fever and weight loss.   Cardiovascular:  Negative for chest pain.   Gastrointestinal:  Negative for abdominal pain and bowel incontinence.   Genitourinary:  Negative for bladder incontinence, dysuria, hematuria and pelvic pain.   Musculoskeletal:  Positive for back pain. Negative for leg pain.   Neurological:  Positive for numbness. Negative for tingling, weakness, headaches and paresthesias.        Past Medical History:   Diagnosis Date    Abnormal Pap smear of cervix 2003    Migraine headache         Review of patient's allergies indicates:   Allergen Reactions    Clarithromycin Rash and Palpitations     Other reaction(s): Hives / Skin Rash        Past Surgical History:   Procedure Laterality Date    CERVICAL BIOPSY  W/ LOOP ELECTRODE EXCISION      CHOLECYSTECTOMY      FALLOPIAN TUBOPLASTY Bilateral     TUBAL LIGATION  2003        Family History   Family history unknown: Yes       Current Outpatient Medications   Medication Instructions    ketorolac (TORADOL) 10 mg, Oral, Every 6 hours    methocarbamoL (ROBAXIN) 500 mg, Oral, 4 times daily    UBRELVY 50 mg, Oral, Daily PRN        Objective     /80   Pulse 78   Ht 5' 6" (1.676 m)   Wt 91.6 kg (202 lb)   BMI 32.60 kg/m²     Physical Exam  Constitutional:       General: She is not in acute distress.     Appearance: Normal appearance. She is not ill-appearing.   HENT: "      Head: Normocephalic and atraumatic.      Right Ear: External ear normal.      Left Ear: External ear normal.   Eyes:      Extraocular Movements: Extraocular movements intact.      Conjunctiva/sclera: Conjunctivae normal.   Cardiovascular:      Rate and Rhythm: Normal rate.      Heart sounds: No murmur heard.  Pulmonary:      Effort: Pulmonary effort is normal.   Musculoskeletal:      Cervical back: Normal range of motion.   Skin:     General: Skin is warm and dry.      Coloration: Skin is not jaundiced.      Findings: No rash.   Neurological:      Mental Status: She is alert.   Psychiatric:         Mood and Affect: Mood normal.        All recently obtained labs have been reviewed and discussed in detail with the patient.   Assessment     1. Muscle spasm    2. Acute midline low back pain with right-sided sciatica         Plan        Problem List Items Addressed This Visit          Orthopedic    Muscle spasm - Primary     Acute low back pain right-sided sciatica.    Concern for disc propulsion versus muscle spasm.    Patient states that positionally speaking as she was a certain direction she gets a sharp pain.    Will prescribe Robaxin 500 mg p.o. q.8h p.r.n. muscle spasm         Relevant Medications    methocarbamoL (ROBAXIN) 500 MG Tab    Acute midline low back pain with right-sided sciatica     Patient with cardboard boxes and possibly pulled muscle feels extreme pain in her lower back.  We will be getting x-ray of the lumbar spine today and be prescribing an inner muscular injection ketorolac in the office today at 30 mg.  I will also send her on all mg p.o. for no longer than 5 days.           Relevant Medications    ketorolac injection 30 mg (Completed)    ketorolac (TORADOL) 10 mg tablet    Other Relevant Orders    X-Ray Lumbar 4-5 View including Bending Views       No follow-ups on file.    Patient advised that is symptoms worsen, they should call or report directly to local emergency  department.    Everton Sims DO  [facility]     Answers submitted by the patient for this visit:  Back Pain Questionnaire (Submitted on 2/13/2023)  Chief Complaint: Back pain  genital pain: No

## 2023-02-14 NOTE — ASSESSMENT & PLAN NOTE
Patient with cardboard boxes and possibly pulled muscle feels extreme pain in her lower back.  We will be getting x-ray of the lumbar spine today and be prescribing an inner muscular injection ketorolac in the office today at 30 mg.  I will also send her on all mg p.o. for no longer than 5 days.

## 2023-02-16 ENCOUNTER — TELEPHONE (OUTPATIENT)
Dept: FAMILY MEDICINE | Facility: CLINIC | Age: 43
End: 2023-02-16
Payer: MEDICAID

## 2023-02-16 DIAGNOSIS — M54.41 ACUTE MIDLINE LOW BACK PAIN WITH RIGHT-SIDED SCIATICA: Primary | ICD-10-CM

## 2023-02-16 RX ORDER — HYDROCODONE BITARTRATE AND ACETAMINOPHEN 5; 325 MG/1; MG/1
1 TABLET ORAL EVERY 6 HOURS PRN
Qty: 28 TABLET | Refills: 0 | Status: SHIPPED | OUTPATIENT
Start: 2023-02-16 | End: 2023-02-23

## 2023-02-16 NOTE — TELEPHONE ENCOUNTER
Patient called today stating her back as still hurting she is asking for a prescription for pain control as well as an MRI.  She likely need to complete physical therapy before insurance will pay for her MRI and I am going to order that for her today.  I will also be sending a supply of Norco 5 mg p.o. q.6 hours p.r.n. pain for a total of 7 days for acute pain.

## 2023-03-14 ENCOUNTER — CLINICAL SUPPORT (OUTPATIENT)
Dept: REHABILITATION | Facility: HOSPITAL | Age: 43
End: 2023-03-14
Payer: MEDICAID

## 2023-03-14 DIAGNOSIS — M54.41 ACUTE MIDLINE LOW BACK PAIN WITH RIGHT-SIDED SCIATICA: Primary | ICD-10-CM

## 2023-03-14 PROCEDURE — 97161 PT EVAL LOW COMPLEX 20 MIN: CPT

## 2023-03-14 NOTE — PLAN OF CARE
[facility]  Physical Therapy Initial Evaluation    Name: Sabiha Dunbar  Clinic Number: [id number]    Therapy Diagnosis:   Encounter Diagnosis   Name Primary?    Acute midline low back pain with right-sided sciatica Yes     Physician: Everton Sims IV, DO    Physician Orders: PT Eval and Treat  Medical Diagnosis from Referral: M54.41 (ICD-10-CM) - Acute midline low back pain with right-sided sciatica  Evaluation Date: 3/14/2023  Authorization Period Expiration: Only initial evaluation approved by Medicaid. Subsequent visits require prior authorization.   Plan of Care Expiration: 4/28/2023  Visit # / Visits authorized: 1/Only initial evaluation approved by Medicaid. Subsequent visits require prior authorization.     Time In: 0850  Time Out: 0925  Total Billable Time: 35 minutes    Precautions: Standard    Subjective     Date of onset: February 2023    History of current condition - Sabiha reports she bent over to tear open a box and immediately felt a sharp pain in her lower back. Patient reports she initially had difficulty bearing weight through her right lower extremity. Patient reports she had to ease herself to the ground and sit for a while before she could get moving again. Patient reports she has been prescribed a muscle relaxer and Norco. Patient reports she takes them at night so that she is able to rest.      Medical History:   Past Medical History:   Diagnosis Date    Abnormal Pap smear of cervix 2003    Migraine headache      Surgical History:   Sabiha Dunbar  has a past surgical history that includes Cervical biopsy w/ loop electrode excision; Cholecystectomy; Tubal ligation (2003); and Fallopian tuboplasty (Bilateral).    Medications:   Sabiha has a current medication list which includes the following prescription(s): ubrelvy.    Allergies:   Review of patient's allergies indicates:   Allergen Reactions    Clarithromycin Rash and Palpitations     Other  "reaction(s): Hives / Skin Rash      Imaging: x-ray of lumbar spine from 2/14/2023: "Mild facet joint osteoarthrosis. No other significant findings."    Prior Therapy: none for presenting condition  Prior Level of Function: independent with all functional mobility without assistive device  Current Level of Function: independent with all functional mobility without assistive device but with increased time, effort, and pain  History of Falls: none  Social History: lives with their spouse; House  Steps/Stairs: 3 steps with a handrail to enter home  Occupation: works from home  Driving: Yes  Durable Medical Equipment: none  Dominant Extremity: right  Affected Extremity: right    Pain:  Current 5/10, worst 7/10, best 2/10   Location: mid-to-right lower back radiating into the right foot  Description: Aching, Dull, Throbbing, Tight, Tingling, Deep, Sharp, and Shooting  Aggravating Factors: rising from a standing position; prolonged positioning; bending  Easing Factors: pain medication and rest    Pts goals: Patient wishes to decrease the pain in her low back so that she can return to her prior level of function.    Objective     Range of Motion/Strength:    Thoracolumbar AROM Pain/Dysfunction with Movement   Flexion 55*    Extension 10*    Right side bending 15*    Left side bending 15*    Right rotation 25*    Left rotation 30*      Hip Right Left Pain/Dysfunction with Movement   AROM/PROM      flexion  90*  WFL Painful on right   extension  neutral  neutral    abduction  WFL  WFL    adduction  WFL  WFL    Internal rotation  WFL  WFL Painful bilaterally   External rotation  WFL  WFL Painful on right     Knee Right Left Pain/Dysfunction with Movement   AROM/PROM      flexion  WFL  WFL    extension  WFL  WFL      Ankle Right Left Pain/Dysfunction with Movement   AROM/PROM      dorsiflexion  WFL  WFL    plantarflexion  WFL  WFL      L/E MMT Right Left Pain/Dysfunction with Movement   Hip Flexion 3+/5 4-/5    Hip Abduction " 4+/5 4+/5    Hip Adduction 4+/5 4+/5    Knee Flexion 3+/5 4+/5    Knee Extension 4/5 4+/5    Ankle DF 4/5 4+/5    Ankle PF 4+/5 4+/5      Posture: rounded shoulders; guarded    Palpation: tenderness on palpation of lumbar spinous processes, specifically L4 and L5; relief with long axis distraction of right lower extremity    Sensation: WFL for light touch, pain, and temperature; reports of intermittent radiculopathy into right lower extremity to foot    Flexibility: -25 degrees left hamstring; -35 degrees right hamstring    Special Tests: positive straight leg raise on right at 55*    Gait Analysis: independent without assistive device; decreased gait speed; guarded    Transfers: modified independent with intermittent use of upper extremities    CMS Impairment/Limitation/Restriction for FOTO Orthopedic Survey    Therapist reviewed FOTO scores for Sabiha Dunbar on 3/14/2023.   FOTO documents entered into EPIC - see Media section.    Functional Score: 51%  Category: Body Position     TREATMENT     Home Exercises and Patient Education Provided    Education provided: Patient educated on the importance of completing skilled physical therapy treatment and home exercise program as prescribed to maximize functional gains.     Written Home Exercises Provided: yes. Exercises were reviewed and Sabiha was able to demonstrate them prior to the end of the session. Sabiha demonstrated good  understanding of the education provided.     See EMR under Patient Instructions for exercises provided 3/14/2023.    Assessment     Sabiha is a 42 y.o. female referred to outpatient physical therapy with a medical diagnosis of M54.41 (ICD-10-CM) - Acute midline low back pain with right-sided sciatica. Pt presents to PT with low back pain with radiculopathy into right lower extremity to foot, decreased thoracolumbar range of motion, and right lower extremity weakness.    Pt prognosis is Good.   Pt will benefit from skilled outpatient  Physical Therapy to address the deficits stated above and in the chart below, provide pt/family education, and to maximize pt's level of independence.     Plan of care discussed with patient: Yes  Pt's spiritual, cultural and educational needs considered and pt agreeable to plan of care and goals as stated below:     Anticipated Barriers for therapy: compliance with home exercise program    Decision Making/ Complexity Score: low    Goals:    Short Term Goals:  Patient will demonstrate independence with home exercise program to ensure carryover of treatment.  Patient will perform sit to/from stand with independence without upper extremity support to improve independence and safety with transfers.  Patient will demonstrate 80 degrees thoracolumbar flexion, 25 degrees thoracolumbar lateral flexion (bilaterally), and 45 degrees thoracolumbar rotation (bilaterally) to improve functional use of thoracolumbar spine.   Patient will improve right lower extremity strength to 4/5 to improve independence and safety with bed mobility, transfers, and gait.  Patient will report a reduction in low back pain from 7/10 to 5/10 at worst to improve quality of life.     Long Term Goals:  Patient will improve right lower extremity strength to 4+/5 to improve independence and safety with bed mobility, transfers, and gait.  Patient will ambulate 300 feet without assistive device with independence with normal gait mechanics including up/down curbs and ramps to improve independence and safety with community ambulation.  Patient will report a reduction in low back pain from 7/10 to 3/10 at worst to improve quality of life.     Plan     Plan of care Certification: 3/14/2023 to 4/28/2023.    Outpatient Physical Therapy 2 times weekly for 6 weeks to include the following interventions: Electrical Stimulation (IFC/premodulated IFC), Manual Therapy, Moist Heat/ Ice, Neuromuscular Re-ed, Patient Education, Therapeutic Activities, and Therapeutic  Exercise.     Additional Information for [employer]     Date of Onset/Injury: 2/1/2023  Dates of Services: 3/14/2023 to 4/28/2023.  Discharge Plan: Patient will be discharged from skilled PT treatment once all goals have been met, patient has plateaued, or physician/insurance requests discontinuation of care. Pt will be discharged with a home exercise program.   Last Face-to-Face Visit with Prescribing Physician: 2/14/2023  Are the services requested intended for initial treatment of an ACUTE medical condition including newly diagnosed condition, illness or surgery? Yes  Did the surgery, injury, or illness occur in the last 6 months? Yes  What is the ACUTE condition requiring treatment?  Acute midline low back pain with right-sided sciatica  Or, is there a chronic underlying illness or injury with an acute exacerbation that will require short term therapy? No  If YES: What is the chronic condition? N/A  CPT Codes and Number of Units Requested:  47885 [therapeutic exercise]  Total units: 36  3 units/visit x 12 visits  35143 [neuromuscular re-education]  Total units: 24  2 units/visit x 12 visits  42316 [manual therapy]  Total units: 12  1 units/visit x 12 visits  32106 [therapeutic activities]  Total units: 12  1 units/visit x 12 visits  99547 [unattended electrical stimulation]  Total units: 12  1 units/visit x 12 visits      Triston Davila, PT, DPT  3/14/2023    I CERTIFY THE NEED FOR THESE SERVICES FURNISHED UNDER THIS PLAN OF TREATMENT AND WHILE UNDER MY CARE.    Physician's comments:

## 2023-03-14 NOTE — PROGRESS NOTES
"[facility]  Physical Therapy Initial Evaluation    Name: Sabiha Dunbar  Clinic Number: [id number]    Therapy Diagnosis:   Encounter Diagnosis   Name Primary?    Acute midline low back pain with right-sided sciatica Yes     Physician: Everton Sims IV, DO    Physician Orders: PT Eval and Treat  Medical Diagnosis from Referral: M54.41 (ICD-10-CM) - Acute midline low back pain with right-sided sciatica  Evaluation Date: 3/14/2023  Authorization Period Expiration: Only initial evaluation approved by Medicaid. Subsequent visits require prior authorization.   Plan of Care Expiration: 2023  Visit # / Visits authorized: 1/Only initial evaluation approved by Medicaid. Subsequent visits require prior authorization.     Time In: ***  Time Out: ***  Total Billable Time: *** minutes    Precautions: {IP WOUND PRECAUTIONS OHS:36205}    Subjective     Date of onset: ***    History of current condition - Sabiha reports: ***     Medical History:   Past Medical History:   Diagnosis Date    Abnormal Pap smear of cervix     Migraine headache      Surgical History:   Sabiha Dunbar  has a past surgical history that includes Cervical biopsy w/ loop electrode excision; Cholecystectomy; Tubal ligation (2003); and Fallopian tuboplasty (Bilateral).    Medications:   Sabiha has a current medication list which includes the following prescription(s): ubrelvy.    Allergies:   Review of patient's allergies indicates:   Allergen Reactions    Clarithromycin Rash and Palpitations     Other reaction(s): Hives / Skin Rash      Imaging: x-ray of lumbar spine from 2023: "Mild facet joint osteoarthrosis. No other significant findings."    Prior Therapy: ***  Prior Level of Function: ***  Current Level of Function: ***  History of Falls: ***  Social History: ***; {LIVES WITH:06275}; {DANHOUSEAPARTMENTMOBILEHOME:49326}  Steps/Stairs: ***  Occupation: ***  Driving: {DANDRIVIN}  Durable " "Medical Equipment: ***  Dominant Extremity: {RIGHT/LEFT:20294}  Affected Extremity: {RIGHT/LEFT/BOTH:21121}    Pain:  Current {0-10:20507::"0"}/10, worst {0-10:20507::"0"}/10, best {0-10:20507::"0"}/10   Location: {RIGHT LEFT BILATERAL:38992} {LOCATION ON BODY:43996}  Description: {Pain Description:33508}  Aggravating Factors: {Causes; Pain:08722}  Easing Factors: {Pain (activities that relieve):72839}    Pts goals: ***    Objective     Range of Motion/Strength:     Thoracolumbar AROM Pain/Dysfunction with Movement   Flexion ***    Extension ***    Right side bending ***    Left side bending ***    Right rotation ***    Left rotation ***      Hip Right Left Pain/Dysfunction with Movement   AROM/PROM      flexion  ***  ***    extension  ***  ***    abduction  ***  ***    adduction  ***  ***    Internal rotation  ***  ***    External rotation  ***  ***      Knee Right Left Pain/Dysfunction with Movement   AROM/PROM      flexion  ***  ***    extension  ***  ***      Ankle Right Left Pain/Dysfunction with Movement   AROM/PROM      dorsiflexion  ***  ***    plantarflexion  ***  ***      L/E MMT Right Left Pain/Dysfunction with Movement   Hip Flexion {AMB PT VESTIBULAR STRENGTH:26852} {AMB PT VESTIBULAR STRENGTH:63080}    Hip Abduction {AMB PT VESTIBULAR STRENGTH:37817} {AMB PT VESTIBULAR STRENGTH:63752}    Hip Adduction {AMB PT VESTIBULAR STRENGTH:18325} {AMB PT VESTIBULAR STRENGTH:20728}    Knee Flexion {AMB PT VESTIBULAR STRENGTH:26277} {AMB PT VESTIBULAR STRENGTH:00202}    Knee Extension {AMB PT VESTIBULAR STRENGTH:41738} {AMB PT VESTIBULAR STRENGTH:13330}    Ankle DF {AMB PT VESTIBULAR STRENGTH:76979} {AMB PT VESTIBULAR STRENGTH:03286}    Ankle PF {AMB PT VESTIBULAR STRENGTH:10067} {AMB PT VESTIBULAR STRENGTH:62510}      Posture: ***    Palpation: ***    Sensation: ***    Flexibility: ***    Gait Analysis: ***    Balance: ***    Transfers: ***    Other: ***    CMS Impairment/Limitation/Restriction for FOTO " Orthopedic Survey    Therapist reviewed FOTO scores for Sabiha Dunbar on 3/14/2023.   FOTO documents entered into [employer] - see Media section.    Functional Score: ***%  Category: Body Position     TREATMENT     Home Exercises and Patient Education Provided    Education provided: Patient educated on the importance of completing skilled physical therapy treatment and home exercise program as prescribed to maximize functional gains.     Written Home Exercises Provided: yes. Exercises were reviewed and Sabiha was able to demonstrate them prior to the end of the session. Sabiha demonstrated {Desc; good/fair/poor:46941} understanding of the education provided.     See EMR under Patient Instructions for exercises provided 3/14/2023.    Assessment     Sabiha is a 42 y.o. female referred to outpatient physical therapy with a medical diagnosis of M54.41 (ICD-10-CM) - Acute midline low back pain with right-sided sciatica. Pt presents to PT ***.    Pt prognosis is {REHAB PROGNOSIS OHS:29438}.   Pt will benefit from skilled outpatient Physical Therapy to address the deficits stated above and in the chart below, provide pt/family education, and to maximize pt's level of independence.     Plan of care discussed with patient: Yes  Pt's spiritual, cultural and educational needs considered and pt agreeable to plan of care and goals as stated below:     Anticipated Barriers for therapy: compliance with home exercise program    Decision Making/ Complexity Score: low    Goals:    Short Term Goals:  Patient will demonstrate independence with home exercise program to ensure carryover of treatment.  Patient will perform supine to/from sit with {DANASSISTLEVELS:85791} to improve independence and safety with bed mobility.  Patient will perform sit to/from stand with {DANASSISTLEVELS:13066} to improve independence and safety with transfers.  Patient will improve {LEFT/RIGHT/BILATERAL:10060} lower extremity strength to  {DANMMTGRADES:79989} to improve independence and safety with bed mobility, transfers, and gait.  Patient will improve {DANBALANCETESTS:86698} to ***/*** to improve dynamic standing balance and lower fall risk.   Patient will ambulate *** feet with a {DANAD:36982} with {DANASSISTLEVELS:29727} to improve independence and safety with gait.   Patient will report a reduction in low back pain from ***/10 to ***/10 at worst to improve quality of life.     Long Term Goals:  Patient will perform supine to/from sit with {DANASSISTLEVELS:51468} to improve independence and safety with bed mobility.  Patient will perform sit to/from stand with {DANASSISTLEVELS:80081} to improve independence and safety with transfers.  Patient will improve {LEFT/RIGHT/BILATERAL:52506} lower extremity strength to {DANMMTGRADES:83923} to improve independence and safety with bed mobility, transfers, and gait.  Patient will improve {DANBALANCETESTS:10287} to ***/*** to improve dynamic standing balance and lower fall risk.   Patient will ambulate *** feet with a {DANAD:83463} with {DANASSISTLEVELS:09389} including up/down curbs and ramps to improve independence and safety with community ambulation.  Patient will report a reduction in low back pain from ***/10 to ***/10 at worst to improve quality of life.     Plan     Plan of care Certification: 3/14/2023 to 4/28/2023.    Outpatient Physical Therapy {NUMBERS 1-5:37536} times weekly for 6 weeks to include the following interventions: {TX PLAN:22196}.     Triston Davila, PT, DPT  3/14/2023    I CERTIFY THE NEED FOR THESE SERVICES FURNISHED UNDER THIS PLAN OF TREATMENT AND WHILE UNDER MY CARE.    Physician's comments:

## 2023-03-22 ENCOUNTER — OFFICE VISIT (OUTPATIENT)
Dept: FAMILY MEDICINE | Facility: CLINIC | Age: 43
End: 2023-03-22
Payer: MEDICAID

## 2023-03-22 VITALS
WEIGHT: 202 LBS | SYSTOLIC BLOOD PRESSURE: 124 MMHG | BODY MASS INDEX: 32.47 KG/M2 | DIASTOLIC BLOOD PRESSURE: 80 MMHG | TEMPERATURE: 98 F | HEART RATE: 96 BPM | HEIGHT: 66 IN

## 2023-03-22 DIAGNOSIS — J02.9 PHARYNGITIS, UNSPECIFIED ETIOLOGY: Primary | ICD-10-CM

## 2023-03-22 DIAGNOSIS — H66.93 OTITIS OF BOTH EARS: ICD-10-CM

## 2023-03-22 DIAGNOSIS — G89.29 CHRONIC MIDLINE LOW BACK PAIN WITH RIGHT-SIDED SCIATICA: ICD-10-CM

## 2023-03-22 DIAGNOSIS — M62.838 MUSCLE SPASM: ICD-10-CM

## 2023-03-22 DIAGNOSIS — M54.41 CHRONIC MIDLINE LOW BACK PAIN WITH RIGHT-SIDED SCIATICA: ICD-10-CM

## 2023-03-22 PROCEDURE — 3008F BODY MASS INDEX DOCD: CPT | Mod: CPTII,,, | Performed by: FAMILY MEDICINE

## 2023-03-22 PROCEDURE — 3074F SYST BP LT 130 MM HG: CPT | Mod: CPTII,,, | Performed by: FAMILY MEDICINE

## 2023-03-22 PROCEDURE — 3008F PR BODY MASS INDEX (BMI) DOCUMENTED: ICD-10-PCS | Mod: CPTII,,, | Performed by: FAMILY MEDICINE

## 2023-03-22 PROCEDURE — 1159F PR MEDICATION LIST DOCUMENTED IN MEDICAL RECORD: ICD-10-PCS | Mod: CPTII,,, | Performed by: FAMILY MEDICINE

## 2023-03-22 PROCEDURE — 1159F MED LIST DOCD IN RCRD: CPT | Mod: CPTII,,, | Performed by: FAMILY MEDICINE

## 2023-03-22 PROCEDURE — 3079F DIAST BP 80-89 MM HG: CPT | Mod: CPTII,,, | Performed by: FAMILY MEDICINE

## 2023-03-22 PROCEDURE — 99214 PR OFFICE/OUTPT VISIT, EST, LEVL IV, 30-39 MIN: ICD-10-PCS | Mod: ,,, | Performed by: FAMILY MEDICINE

## 2023-03-22 PROCEDURE — 3074F PR MOST RECENT SYSTOLIC BLOOD PRESSURE < 130 MM HG: ICD-10-PCS | Mod: CPTII,,, | Performed by: FAMILY MEDICINE

## 2023-03-22 PROCEDURE — 3079F PR MOST RECENT DIASTOLIC BLOOD PRESSURE 80-89 MM HG: ICD-10-PCS | Mod: CPTII,,, | Performed by: FAMILY MEDICINE

## 2023-03-22 PROCEDURE — 99214 OFFICE O/P EST MOD 30 MIN: CPT | Mod: ,,, | Performed by: FAMILY MEDICINE

## 2023-03-22 RX ORDER — AMOXICILLIN AND CLAVULANATE POTASSIUM 875; 125 MG/1; MG/1
1 TABLET, FILM COATED ORAL EVERY 12 HOURS
Qty: 14 TABLET | Refills: 0 | Status: SHIPPED | OUTPATIENT
Start: 2023-03-22 | End: 2023-03-29

## 2023-03-22 RX ORDER — METHOCARBAMOL 500 MG/1
500 TABLET, FILM COATED ORAL 4 TIMES DAILY
Qty: 40 TABLET | Refills: 0 | Status: SHIPPED | OUTPATIENT
Start: 2023-03-22 | End: 2023-04-01

## 2023-03-22 RX ORDER — PREDNISONE 50 MG/1
50 TABLET ORAL DAILY
Qty: 5 TABLET | Refills: 0 | Status: SHIPPED | OUTPATIENT
Start: 2023-03-22 | End: 2023-03-27

## 2023-03-22 RX ORDER — HYDROCODONE BITARTRATE AND ACETAMINOPHEN 5; 325 MG/1; MG/1
1 TABLET ORAL EVERY 6 HOURS PRN
Qty: 28 TABLET | Refills: 0 | Status: SHIPPED | OUTPATIENT
Start: 2023-03-22 | End: 2023-03-29

## 2023-03-22 NOTE — PROGRESS NOTES
"              Evertno Sims IV, DO  [facility]  [address] [place] [address]  Phone: [phone]      Subjective     Name: Sabiha Dunbar   Sex: female  YOB: 1980 (42 y.o.)  MRN: [id number]  Visit Date: 03/22/2023   Chief Complaint: Cough and Otalgia (X 2 days)        HISTORY OF PRESENT ILLNESS:    Chief Complaint   Patient presents with    Cough    Otalgia     X 2 days       Sore Throat   The current episode started yesterday. The problem has been gradually worsening. Neither side of throat is experiencing more pain than the other. The maximum temperature recorded prior to her arrival was 100 - 100.9 F. The fever has been present for 1 to 2 days. The pain is at a severity of 4/10. The pain is mild. Associated symptoms include congestion, coughing, ear pain, headaches, a hoarse voice, a plugged ear sensation, neck pain and swollen glands. Pertinent negatives include no abdominal pain, diarrhea, drooling, ear discharge, shortness of breath, stridor, trouble swallowing or vomiting. She has had exposure to strep. She has tried NSAIDs, acetaminophen and cool liquids for the symptoms. The treatment provided mild relief.   See tests that keep you healthy and the problem oriented documentation below.    All of your core healthy metrics are met.      Portions of this note may have been created with voice recognition software. Occasional "wrong-word" or "sound-a-like" substitutions may have occurred due to the inherent limitations of voice recognition software. Please, read the note carefully and recognize, using context, where substitutions have occurred.     PAST MEDICAL HISTORY:  Significant Diagnoses - Patient  has a past medical history of Abnormal Pap smear of cervix (2003) and Migraine headache.  Medications - Patient is not on any long-term medications.   Allergies - Patient is allergic to clarithromycin.  Surgeries - Patient  has a past surgical history that includes " "Cervical biopsy w/ loop electrode excision; Cholecystectomy; Tubal ligation (2003); and Fallopian tuboplasty (Bilateral).  Family History - Patient Family history is unknown by patient.      SOCIAL HISTORY:  Tobacco - Patient  reports that she has never smoked. She has never used smokeless tobacco.   Alcohol - Patient  reports current alcohol use of about 2.0 standard drinks per week.   Recreational Drugs - Patient  reports no history of drug use.       Review of Systems   HENT:  Positive for nasal congestion, ear pain, hoarse voice and sore throat. Negative for drooling, ear discharge and trouble swallowing.    Respiratory:  Positive for cough. Negative for shortness of breath and stridor.    Gastrointestinal:  Negative for abdominal pain, diarrhea and vomiting.   Musculoskeletal:  Positive for neck pain.   Neurological:  Positive for headaches.        Past Medical History:   Diagnosis Date    Abnormal Pap smear of cervix 2003    Migraine headache         Review of patient's allergies indicates:   Allergen Reactions    Clarithromycin Rash and Palpitations     Other reaction(s): Hives / Skin Rash        Past Surgical History:   Procedure Laterality Date    CERVICAL BIOPSY  W/ LOOP ELECTRODE EXCISION      CHOLECYSTECTOMY      FALLOPIAN TUBOPLASTY Bilateral     TUBAL LIGATION  2003        Family History   Family history unknown: Yes       Current Outpatient Medications   Medication Instructions    amoxicillin-clavulanate 875-125mg (AUGMENTIN) 875-125 mg per tablet 1 tablet, Oral, Every 12 hours    HYDROcodone-acetaminophen (NORCO) 5-325 mg per tablet 1 tablet, Oral, Every 6 hours PRN    methocarbamoL (ROBAXIN) 500 mg, Oral, 4 times daily    predniSONE (DELTASONE) 50 mg, Oral, Daily    UBRELVY 50 mg, Oral, Daily PRN        Objective     /80   Pulse 96   Temp 98.3 °F (36.8 °C)   Ht 5' 6" (1.676 m)   Wt 91.6 kg (202 lb)   BMI 32.60 kg/m²     Physical Exam  Constitutional:       General: She is not " in acute distress.     Appearance: Normal appearance. She is not ill-appearing.   HENT:      Head: Normocephalic and atraumatic.      Right Ear: External ear normal.      Left Ear: External ear normal.   Eyes:      Extraocular Movements: Extraocular movements intact.      Conjunctiva/sclera: Conjunctivae normal.   Cardiovascular:      Rate and Rhythm: Normal rate.      Heart sounds: No murmur heard.  Pulmonary:      Effort: Pulmonary effort is normal.   Musculoskeletal:      Cervical back: Normal range of motion.   Skin:     General: Skin is warm and dry.      Coloration: Skin is not jaundiced.      Findings: No rash.   Neurological:      Mental Status: She is alert.   Psychiatric:         Mood and Affect: Mood normal.        All recently obtained labs have been reviewed and discussed in detail with the patient.   Assessment     1. Pharyngitis, unspecified etiology    2. Otitis of both ears    3. Muscle spasm    4. Chronic midline low back pain with right-sided sciatica         Plan        Problem List Items Addressed This Visit          ENT    Otitis of both ears     Augmentin 875 p.o. b.i.d. for 7 days.         Relevant Medications    amoxicillin-clavulanate 875-125mg (AUGMENTIN) 875-125 mg per tablet    predniSONE (DELTASONE) 50 MG Tab    Pharyngitis - Primary     Augmentin 875 p.o. b.i.d. for 7 days         Relevant Medications    amoxicillin-clavulanate 875-125mg (AUGMENTIN) 875-125 mg per tablet    predniSONE (DELTASONE) 50 MG Tab       Orthopedic    Muscle spasm    Relevant Medications    methocarbamoL (ROBAXIN) 500 MG Tab    Chronic midline low back pain with right-sided sciatica    Relevant Medications    HYDROcodone-acetaminophen (NORCO) 5-325 mg per tablet       No follow-ups on file.    Patient advised that is symptoms worsen, they should call or report directly to local emergency department.    Everton Sims DO  [facility]

## 2023-03-28 ENCOUNTER — CLINICAL SUPPORT (OUTPATIENT)
Dept: REHABILITATION | Facility: HOSPITAL | Age: 43
End: 2023-03-28
Payer: MEDICAID

## 2023-03-28 DIAGNOSIS — G89.29 CHRONIC MIDLINE LOW BACK PAIN WITH RIGHT-SIDED SCIATICA: Primary | ICD-10-CM

## 2023-03-28 DIAGNOSIS — M54.41 CHRONIC MIDLINE LOW BACK PAIN WITH RIGHT-SIDED SCIATICA: Primary | ICD-10-CM

## 2023-03-28 PROCEDURE — 97010 HOT OR COLD PACKS THERAPY: CPT | Mod: CQ

## 2023-03-28 PROCEDURE — 97110 THERAPEUTIC EXERCISES: CPT | Mod: CQ

## 2023-03-28 PROCEDURE — 97112 NEUROMUSCULAR REEDUCATION: CPT | Mod: CQ

## 2023-03-28 NOTE — PROGRESS NOTES
[facility]  Physical Therapy Treatment Note    Name: Sabiha Dunbar  Clinic Number: [id number]    Therapy Diagnosis: No diagnosis found.  Physician: Everton Sims IV, DO    Visit Date: 3/28/2023    Physician Orders: PT Eval and Treat  Medical Diagnosis from Referral: M54.41 (ICD-10-CM) - Acute midline low back pain with right-sided sciatica  Evaluation Date: 3/14/2023  Authorization Period Expiration: Only initial evaluation approved by Medicaid. Subsequent visits require prior authorization.   Plan of Care Expiration: 4/28/2023  Visit # / Visits authorized: 2/13   PTA Visit #: 1    Time In: 1350  Time Out: 1437  Total Billable Time: 47 minutes    Precautions: Standard    Subjective     Pt reports: Patient states she is having no pain and doing good right now.    She was compliant with home exercise program.    Pain: 0/10  Location: right back      Objective     Sabiha received therapeutic exercises to develop strength, flexibility, and core stabilization for 24 minutes including:  Nu step x 6 minutes   Pelvic tilts 2 x 10  Supine marching 2 x 10   SLR 2 x 10  Hip adduction 2 x 10   LTR x 10 x 10 second holds   SKTC 3 x 20 second holds   Prone press ups x 10 with 3 second holds (patient unable to perform due to pain)   Hamstring stretch 3 x 20 second holds     Sabiha participated in neuromuscular re-education activities to improve: Balance for 8 minutes. The following activities were included:  Bridges 2 x 10   Standing hip abduction 2 x 10    Sabiha participated in dynamic functional therapeutic activities to improve functional performance for 5 minutes, including:  Chair squats x 10    Sabiha received heat to her lower back for 10 minutes following treatment today.     Home Exercises Provided and Patient Education Provided     Education provided: no new exercises added this visit     Written Home Exercises Provided: Patient instructed to cont prior HEP.  Exercises were reviewed and Sabiha was able to demonstrate them prior to the end of the session.  Sabiha demonstrated good  understanding of the education provided.     See EMR under Patient Instructions for exercises provided prior visit.    Assessment     Patient able to perform all exercises with no rest breaks. Patient did report pain following exercises but had a decrease in pain following heat.     Sabiha isprogressing well towards her goals.   Pt prognosis is Good.     Pt will continue to benefit from skilled outpatient physical therapy to address the deficits listed in the problem list box on initial evaluation, provide pt/family education, and to maximize pt's level of independence in the home and community environment.     Pt's spiritual, cultural, and educational needs considered and pt agreeable to plan of care and goals.     Anticipated barriers to physical therapy: compliance with home exercise program    Goals:    Short Term Goals:  Patient will demonstrate independence with home exercise program to ensure carryover of treatment.  Patient will perform sit to/from stand with independence without upper extremity support to improve independence and safety with transfers.  Patient will demonstrate 80 degrees thoracolumbar flexion, 25 degrees thoracolumbar lateral flexion (bilaterally), and 45 degrees thoracolumbar rotation (bilaterally) to improve functional use of thoracolumbar spine.   Patient will improve right lower extremity strength to 4/5 to improve independence and safety with bed mobility, transfers, and gait.  Patient will report a reduction in low back pain from 7/10 to 5/10 at worst to improve quality of life.      Long Term Goals:  Patient will improve right lower extremity strength to 4+/5 to improve independence and safety with bed mobility, transfers, and gait.  Patient will ambulate 300 feet without assistive device with independence with normal gait mechanics including up/down  curbs and ramps to improve independence and safety with community ambulation.  Patient will report a reduction in low back pain from 7/10 to 3/10 at worst to improve quality of life.     Plan     Will continue with appropriate POC      Yasmeen Zee, PTA  3/28/2023

## 2023-04-04 ENCOUNTER — CLINICAL SUPPORT (OUTPATIENT)
Dept: REHABILITATION | Facility: HOSPITAL | Age: 43
End: 2023-04-04
Payer: MEDICAID

## 2023-04-04 DIAGNOSIS — M54.41 CHRONIC MIDLINE LOW BACK PAIN WITH RIGHT-SIDED SCIATICA: Primary | ICD-10-CM

## 2023-04-04 DIAGNOSIS — G89.29 CHRONIC MIDLINE LOW BACK PAIN WITH RIGHT-SIDED SCIATICA: Primary | ICD-10-CM

## 2023-04-04 PROCEDURE — 97112 NEUROMUSCULAR REEDUCATION: CPT | Mod: CQ

## 2023-04-04 PROCEDURE — 97110 THERAPEUTIC EXERCISES: CPT | Mod: CQ

## 2023-04-04 NOTE — PROGRESS NOTES
[facility]  Physical Therapy Treatment Note    Name: Sabiha Dunbar  Clinic Number: [id number]    Therapy Diagnosis: No diagnosis found.  Physician: Everton Sims IV, DO    Visit Date: 4/4/2023    Physician Orders: PT Eval and Treat  Medical Diagnosis from Referral: M54.41 (ICD-10-CM) - Acute midline low back pain with right-sided sciatica  Evaluation Date: 3/14/2023  Authorization Period Expiration: Only initial evaluation approved by Medicaid. Subsequent visits require prior authorization.   Plan of Care Expiration: 4/28/2023  Visit # / Visits authorized: 3/13   PTA Visit #: 2    Time In: 0933 (patient's treatment started before patient was checked in)   Time Out: 1012  Total Billable Time: 39 minutes    Precautions: Standard    Subjective     Pt reports: Patient states she is having no pain and doing a lot better. She also reports she has not had to take pain medicine recently either.     She was compliant with home exercise program.    Pain: 0/10  Location: right back      Objective     Sabiha received therapeutic exercises to develop strength, flexibility, and core stabilization for 29 minutes including:  Nu step x 6 minutes   Slant board 3 x 30 second holds   Supine marching 2 x 10   SLR 2 x 10  Hip adduction 2 x 10   LTR x 10 x 10 second holds   SKTC 3 x 20 second holds   Hamstring stretch 3 x 20 second holds     Sabiha participated in neuromuscular re-education activities to improve: Balance for 10 minutes. The following activities were included:  Pelvic tilts 2 x 10   Bridges 2 x 10   Standing hip abduction 2 x 10    Sabiha participated in dynamic functional therapeutic activities to improve functional performance for 0 minutes, including:  Chair squats x 10    Sabiha received heat to her lower back for 0 minutes following treatment today.     Home Exercises Provided and Patient Education Provided     Education provided: no new exercises added this visit      Written Home Exercises Provided: Patient instructed to cont prior HEP. Exercises were reviewed and Sabiha was able to demonstrate them prior to the end of the session.  Sabiha demonstrated good  understanding of the education provided.     See EMR under Patient Instructions for exercises provided prior visit.    Assessment     Patient able to perform all exercises with no rest breaks. Patient with no new complaints following therapy today.     Sabiha isprogressing well towards her goals.   Pt prognosis is Good.     Pt will continue to benefit from skilled outpatient physical therapy to address the deficits listed in the problem list box on initial evaluation, provide pt/family education, and to maximize pt's level of independence in the home and community environment.     Pt's spiritual, cultural, and educational needs considered and pt agreeable to plan of care and goals.     Anticipated barriers to physical therapy: compliance with home exercise program    Goals:    Short Term Goals:  Patient will demonstrate independence with home exercise program to ensure carryover of treatment.  Patient will perform sit to/from stand with independence without upper extremity support to improve independence and safety with transfers.  Patient will demonstrate 80 degrees thoracolumbar flexion, 25 degrees thoracolumbar lateral flexion (bilaterally), and 45 degrees thoracolumbar rotation (bilaterally) to improve functional use of thoracolumbar spine.   Patient will improve right lower extremity strength to 4/5 to improve independence and safety with bed mobility, transfers, and gait.  Patient will report a reduction in low back pain from 7/10 to 5/10 at worst to improve quality of life.      Long Term Goals:  Patient will improve right lower extremity strength to 4+/5 to improve independence and safety with bed mobility, transfers, and gait.  Patient will ambulate 300 feet without assistive device with independence  with normal gait mechanics including up/down curbs and ramps to improve independence and safety with community ambulation.  Patient will report a reduction in low back pain from 7/10 to 3/10 at worst to improve quality of life.     Plan     Will continue with appropriate POC      Yasmeen Zee, PTA  4/4/2023

## 2023-04-18 ENCOUNTER — OFFICE VISIT (OUTPATIENT)
Dept: FAMILY MEDICINE | Facility: CLINIC | Age: 43
End: 2023-04-18
Payer: MEDICAID

## 2023-04-18 VITALS
DIASTOLIC BLOOD PRESSURE: 73 MMHG | HEART RATE: 76 BPM | HEIGHT: 66 IN | SYSTOLIC BLOOD PRESSURE: 105 MMHG | WEIGHT: 202 LBS | BODY MASS INDEX: 32.47 KG/M2

## 2023-04-18 DIAGNOSIS — G89.29 OTHER CHRONIC PAIN: Primary | ICD-10-CM

## 2023-04-18 DIAGNOSIS — M79.642 PAIN IN BOTH HANDS: ICD-10-CM

## 2023-04-18 DIAGNOSIS — M79.641 PAIN IN BOTH HANDS: ICD-10-CM

## 2023-04-18 DIAGNOSIS — Z13.9 SCREENING DUE: ICD-10-CM

## 2023-04-18 DIAGNOSIS — G43.101 MIGRAINE WITH AURA AND WITH STATUS MIGRAINOSUS, NOT INTRACTABLE: ICD-10-CM

## 2023-04-18 DIAGNOSIS — M79.89 LEG SWELLING: ICD-10-CM

## 2023-04-18 DIAGNOSIS — Z12.31 SCREENING MAMMOGRAM FOR BREAST CANCER: ICD-10-CM

## 2023-04-18 PROCEDURE — 80061 LIPID PANEL: CPT | Mod: ,,, | Performed by: CLINICAL MEDICAL LABORATORY

## 2023-04-18 PROCEDURE — 86140 C-REACTIVE PROTEIN: ICD-10-PCS | Mod: ,,, | Performed by: CLINICAL MEDICAL LABORATORY

## 2023-04-18 PROCEDURE — 3078F PR MOST RECENT DIASTOLIC BLOOD PRESSURE < 80 MM HG: ICD-10-PCS | Mod: CPTII,,, | Performed by: FAMILY MEDICINE

## 2023-04-18 PROCEDURE — 83036 HEMOGLOBIN A1C: ICD-10-PCS | Mod: ,,, | Performed by: CLINICAL MEDICAL LABORATORY

## 2023-04-18 PROCEDURE — 3074F SYST BP LT 130 MM HG: CPT | Mod: CPTII,,, | Performed by: FAMILY MEDICINE

## 2023-04-18 PROCEDURE — 85651 SEDIMENTATION RATE, AUTOMATED: ICD-10-PCS | Mod: ,,, | Performed by: CLINICAL MEDICAL LABORATORY

## 2023-04-18 PROCEDURE — 86140 C-REACTIVE PROTEIN: CPT | Mod: ,,, | Performed by: CLINICAL MEDICAL LABORATORY

## 2023-04-18 PROCEDURE — 86038 ANA EIA W/REFLEX DSDNA/ENA: ICD-10-PCS | Mod: ,,, | Performed by: CLINICAL MEDICAL LABORATORY

## 2023-04-18 PROCEDURE — 3074F PR MOST RECENT SYSTOLIC BLOOD PRESSURE < 130 MM HG: ICD-10-PCS | Mod: CPTII,,, | Performed by: FAMILY MEDICINE

## 2023-04-18 PROCEDURE — 99214 PR OFFICE/OUTPT VISIT, EST, LEVL IV, 30-39 MIN: ICD-10-PCS | Mod: ,,, | Performed by: FAMILY MEDICINE

## 2023-04-18 PROCEDURE — 3078F DIAST BP <80 MM HG: CPT | Mod: CPTII,,, | Performed by: FAMILY MEDICINE

## 2023-04-18 PROCEDURE — 1159F MED LIST DOCD IN RCRD: CPT | Mod: CPTII,,, | Performed by: FAMILY MEDICINE

## 2023-04-18 PROCEDURE — 3008F BODY MASS INDEX DOCD: CPT | Mod: CPTII,,, | Performed by: FAMILY MEDICINE

## 2023-04-18 PROCEDURE — 86430 RHEUMATOID FACTOR SCREEN: ICD-10-PCS | Mod: ,,, | Performed by: CLINICAL MEDICAL LABORATORY

## 2023-04-18 PROCEDURE — 99214 OFFICE O/P EST MOD 30 MIN: CPT | Mod: ,,, | Performed by: FAMILY MEDICINE

## 2023-04-18 PROCEDURE — 3008F PR BODY MASS INDEX (BMI) DOCUMENTED: ICD-10-PCS | Mod: CPTII,,, | Performed by: FAMILY MEDICINE

## 2023-04-18 PROCEDURE — 1159F PR MEDICATION LIST DOCUMENTED IN MEDICAL RECORD: ICD-10-PCS | Mod: CPTII,,, | Performed by: FAMILY MEDICINE

## 2023-04-18 PROCEDURE — 85651 RBC SED RATE NONAUTOMATED: CPT | Mod: ,,, | Performed by: CLINICAL MEDICAL LABORATORY

## 2023-04-18 PROCEDURE — 83036 HEMOGLOBIN GLYCOSYLATED A1C: CPT | Mod: ,,, | Performed by: CLINICAL MEDICAL LABORATORY

## 2023-04-18 PROCEDURE — 86430 RHEUMATOID FACTOR TEST QUAL: CPT | Mod: ,,, | Performed by: CLINICAL MEDICAL LABORATORY

## 2023-04-18 PROCEDURE — 86038 ANTINUCLEAR ANTIBODIES: CPT | Mod: ,,, | Performed by: CLINICAL MEDICAL LABORATORY

## 2023-04-18 PROCEDURE — 80061 LIPID PANEL: ICD-10-PCS | Mod: ,,, | Performed by: CLINICAL MEDICAL LABORATORY

## 2023-04-19 LAB
CHOLEST SERPL-MCNC: 197 MG/DL (ref 0–200)
CHOLEST/HDLC SERPL: 4.3 {RATIO}
CRP SERPL-MCNC: <0.29 MG/DL (ref 0–0.8)
D DIMER PPP FEU-MCNC: 0.77 ΜG/ML (ref 0–0.47)
ERYTHROCYTE [SEDIMENTATION RATE] IN BLOOD BY WESTERGREN METHOD: 8 MM/HR (ref 0–20)
EST. AVERAGE GLUCOSE BLD GHB EST-MCNC: 90 MG/DL
HBA1C MFR BLD HPLC: 5.3 % (ref 4.5–6.6)
HDLC SERPL-MCNC: 46 MG/DL (ref 40–60)
LDLC SERPL CALC-MCNC: 81 MG/DL
LDLC/HDLC SERPL: 1.8 {RATIO}
NONHDLC SERPL-MCNC: 151 MG/DL
RHEUMATOID FACT SER NEPH-ACNC: NEGATIVE [IU]/ML
TRIGL SERPL-MCNC: 351 MG/DL (ref 35–150)
VLDLC SERPL-MCNC: 70 MG/DL

## 2023-04-19 NOTE — PROGRESS NOTES
"              Everton Sims IV, DO  [facility]  [address] [place] [address]  Phone: [phone]      Subjective     Name: Sabiha Dunbar   Sex: female  YOB: 1980 (42 y.o.)  MRN: [id number]  Visit Date: 04/19/2023   Chief Complaint: joints ache (Hurts all the time)        HISTORY OF PRESENT ILLNESS:    Chief Complaint   Patient presents with    joints ache     Hurts all the time       HPI  See tests that keep you healthy and the problem oriented documentation below.    Tests to Keep You Healthy    Mammogram: ORDERED BUT NOT SCHEDULED  Cervical Cancer Screening: Met on 3/29/2022        Portions of this note may have been created with voice recognition software. Occasional "wrong-word" or "sound-a-like" substitutions may have occurred due to the inherent limitations of voice recognition software. Please, read the note carefully and recognize, using context, where substitutions have occurred.     PAST MEDICAL HISTORY:  Significant Diagnoses - Patient  has a past medical history of Abnormal Pap smear of cervix (2003) and Migraine headache.  Medications - Patient is not on any long-term medications.   Allergies - Patient is allergic to clarithromycin.  Surgeries - Patient  has a past surgical history that includes Cervical biopsy w/ loop electrode excision; Cholecystectomy; Tubal ligation (2003); and Fallopian tuboplasty (Bilateral).  Family History - Patient family history includes Arthritis in her mother.      SOCIAL HISTORY:  Tobacco - Patient  reports that she has never smoked. She has never used smokeless tobacco.   Alcohol - Patient  reports current alcohol use of about 2.0 standard drinks per week.   Recreational Drugs - Patient  reports no history of drug use.       Review of Systems   All other systems reviewed and are negative.       Past Medical History:   Diagnosis Date    Abnormal Pap smear of cervix 2003    Migraine headache         Review of patient's " "allergies indicates:   Allergen Reactions    Clarithromycin Rash and Palpitations     Other reaction(s): Hives / Skin Rash        Past Surgical History:   Procedure Laterality Date    CERVICAL BIOPSY  W/ LOOP ELECTRODE EXCISION      CHOLECYSTECTOMY      FALLOPIAN TUBOPLASTY Bilateral     TUBAL LIGATION  2003        Family History   Problem Relation Age of Onset    Arthritis Mother        Current Outpatient Medications   Medication Instructions    atogepant 60 mg, Oral, Daily    UBRELVY 50 mg, Oral, Daily PRN        Objective     /73   Pulse 76   Ht 5' 6" (1.676 m)   Wt 91.6 kg (202 lb)   BMI 32.60 kg/m²     Physical Exam  Constitutional:       General: She is not in acute distress.     Appearance: Normal appearance. She is not ill-appearing.   HENT:      Head: Normocephalic and atraumatic.      Right Ear: External ear normal.      Left Ear: External ear normal.   Eyes:      Extraocular Movements: Extraocular movements intact.      Conjunctiva/sclera: Conjunctivae normal.   Cardiovascular:      Rate and Rhythm: Normal rate.      Heart sounds: No murmur heard.  Pulmonary:      Effort: Pulmonary effort is normal.   Musculoskeletal:      Cervical back: Normal range of motion.   Skin:     General: Skin is warm and dry.      Coloration: Skin is not jaundiced.      Findings: No rash.   Neurological:      Mental Status: She is alert.   Psychiatric:         Mood and Affect: Mood normal.        All recently obtained labs have been reviewed and discussed in detail with the patient.   Assessment     1. Other chronic pain    2. Pain in both hands    3. Screening mammogram for breast cancer    4. Screening due    5. Migraine with aura and with status migrainosus, not intractable    6. Leg swelling         Plan        Problem List Items Addressed This Visit          Neuro    Other chronic pain - Primary    Relevant Orders    Lipid Panel    Hemoglobin A1C    Mammo Digital Screening Bilat    Sedimentation Rate    " C-Reactive Protein    Rheumatoid Factor Screen    TUNDE EIA w/ Reflex to dsDNA/KAIDEN    Migraine with aura and with status migrainosus, not intractable    Relevant Medications    atogepant 60 mg Tab    Other Relevant Orders    Lipid Panel    Hemoglobin A1C    Mammo Digital Screening Bilat     Other Visit Diagnoses       Pain in both hands        Relevant Orders    Lipid Panel    Hemoglobin A1C    Mammo Digital Screening Bilat    Screening mammogram for breast cancer        Relevant Orders    Lipid Panel    Hemoglobin A1C    Mammo Digital Screening Bilat    Screening due        Relevant Orders    Lipid Panel    Hemoglobin A1C    Mammo Digital Screening Bilat    Leg swelling        Relevant Orders    D-Dimer, Quantitative            No follow-ups on file.    Patient advised that is symptoms worsen, they should call or report directly to local emergency department.    Everton Sims, DO  [facility]

## 2023-04-20 DIAGNOSIS — M79.89 LEG SWELLING: Primary | ICD-10-CM

## 2023-04-20 LAB — ANA SER QL: NEGATIVE

## 2023-04-24 ENCOUNTER — HOSPITAL ENCOUNTER (OUTPATIENT)
Dept: RADIOLOGY | Facility: HOSPITAL | Age: 43
Discharge: HOME | End: 2023-04-24
Attending: FAMILY MEDICINE
Payer: MEDICAID

## 2023-04-24 DIAGNOSIS — M79.89 LEG SWELLING: ICD-10-CM

## 2023-04-24 PROCEDURE — 93970 EXTREMITY STUDY: CPT | Mod: TC

## 2023-07-05 ENCOUNTER — OFFICE VISIT (OUTPATIENT)
Dept: FAMILY MEDICINE | Facility: CLINIC | Age: 43
End: 2023-07-05
Payer: MEDICAID

## 2023-07-05 VITALS
DIASTOLIC BLOOD PRESSURE: 84 MMHG | SYSTOLIC BLOOD PRESSURE: 129 MMHG | HEIGHT: 66 IN | BODY MASS INDEX: 32.33 KG/M2 | HEART RATE: 96 BPM | WEIGHT: 201.19 LBS

## 2023-07-05 DIAGNOSIS — R51.9 WORSENING HEADACHES: Primary | ICD-10-CM

## 2023-07-05 DIAGNOSIS — Z3A.12 12 WEEKS GESTATION OF PREGNANCY: ICD-10-CM

## 2023-07-05 PROCEDURE — 1159F MED LIST DOCD IN RCRD: CPT | Mod: CPTII,,, | Performed by: FAMILY MEDICINE

## 2023-07-05 PROCEDURE — 3044F HG A1C LEVEL LT 7.0%: CPT | Mod: CPTII,,, | Performed by: FAMILY MEDICINE

## 2023-07-05 PROCEDURE — 3008F PR BODY MASS INDEX (BMI) DOCUMENTED: ICD-10-PCS | Mod: CPTII,,, | Performed by: FAMILY MEDICINE

## 2023-07-05 PROCEDURE — 1159F PR MEDICATION LIST DOCUMENTED IN MEDICAL RECORD: ICD-10-PCS | Mod: CPTII,,, | Performed by: FAMILY MEDICINE

## 2023-07-05 PROCEDURE — 3074F PR MOST RECENT SYSTOLIC BLOOD PRESSURE < 130 MM HG: ICD-10-PCS | Mod: CPTII,,, | Performed by: FAMILY MEDICINE

## 2023-07-05 PROCEDURE — 99213 OFFICE O/P EST LOW 20 MIN: CPT | Mod: ,,, | Performed by: FAMILY MEDICINE

## 2023-07-05 PROCEDURE — 3079F PR MOST RECENT DIASTOLIC BLOOD PRESSURE 80-89 MM HG: ICD-10-PCS | Mod: CPTII,,, | Performed by: FAMILY MEDICINE

## 2023-07-05 PROCEDURE — 3044F PR MOST RECENT HEMOGLOBIN A1C LEVEL <7.0%: ICD-10-PCS | Mod: CPTII,,, | Performed by: FAMILY MEDICINE

## 2023-07-05 PROCEDURE — 3079F DIAST BP 80-89 MM HG: CPT | Mod: CPTII,,, | Performed by: FAMILY MEDICINE

## 2023-07-05 PROCEDURE — 3074F SYST BP LT 130 MM HG: CPT | Mod: CPTII,,, | Performed by: FAMILY MEDICINE

## 2023-07-05 PROCEDURE — 3008F BODY MASS INDEX DOCD: CPT | Mod: CPTII,,, | Performed by: FAMILY MEDICINE

## 2023-07-05 PROCEDURE — 99213 PR OFFICE/OUTPT VISIT, EST, LEVL III, 20-29 MIN: ICD-10-PCS | Mod: ,,, | Performed by: FAMILY MEDICINE

## 2023-07-05 RX ORDER — BUTALBITAL, ACETAMINOPHEN AND CAFFEINE 50; 325; 40 MG/1; MG/1; MG/1
1 TABLET ORAL EVERY 6 HOURS PRN
Qty: 8 TABLET | Refills: 0 | Status: SHIPPED | OUTPATIENT
Start: 2023-07-05 | End: 2023-07-09

## 2023-07-05 RX ORDER — FOLIC ACID 1 MG/1
1 TABLET ORAL DAILY
COMMUNITY

## 2023-07-05 RX ORDER — FOLIC ACID/MULTIVIT,IRON,MINER 0.4MG-18MG
TABLET ORAL
COMMUNITY

## 2023-07-05 NOTE — PROGRESS NOTES
"              Everton Sims IV, DO  [facility]  [address] [place] [address]  Phone: [phone]      Subjective     Name: Sabiah Dunbar   Sex: female  YOB: 1980 (42 y.o.)  MRN: [id number]  Visit Date: 07/05/2023   Chief Complaint: Headache (Pt c/o of headache x 3 days pt stated she can't take otc meds due to pregnacy.)        HISTORY OF PRESENT ILLNESS:    Chief Complaint   Patient presents with    Headache     Pt c/o of headache x 3 days pt stated she can't take otc meds due to pregnacy.       HPI  See tests that keep you healthy and the problem oriented documentation below.    Tests to Keep You Healthy    Mammogram: ORDERED BUT NOT SCHEDULED  Cervical Cancer Screening: Met on 3/29/2022      Portions of this note may have been created with voice recognition software. Occasional "wrong-word" or "sound-a-like" substitutions may have occurred due to the inherent limitations of voice recognition software. Please, read the note carefully and recognize, using context, where substitutions have occurred.     PAST MEDICAL HISTORY:  Significant Diagnoses - Patient  has a past medical history of Abnormal Pap smear of cervix (2003) and Migraine headache.  Medications - Patient has a current medication list which includes the following long-term medication(s): folic acid and pnv cmb#95-ferrous fumarate-fa.   Allergies - Patient is allergic to clarithromycin.  Surgeries - Patient  has a past surgical history that includes Cervical biopsy w/ loop electrode excision; Cholecystectomy; Tubal ligation (2003); and Fallopian tuboplasty (Bilateral).  Family History - Patient family history includes Arthritis in her mother.      SOCIAL HISTORY:  Tobacco - Patient  reports that she has never smoked. She has never been exposed to tobacco smoke. She has never used smokeless tobacco.   Alcohol - Patient  reports current alcohol use of about 2.0 standard drinks per week.   Recreational Drugs - " "Patient  reports no history of drug use.       Review of Systems   Constitutional:  Negative for activity change and unexpected weight change.   HENT:  Negative for hearing loss, rhinorrhea and trouble swallowing.    Eyes:  Negative for discharge and visual disturbance.   Respiratory:  Negative for chest tightness and wheezing.    Cardiovascular:  Negative for chest pain and palpitations.   Gastrointestinal:  Negative for blood in stool, constipation, diarrhea and vomiting.   Endocrine: Negative for polydipsia and polyuria.   Genitourinary:  Negative for difficulty urinating, dysuria, hematuria and menstrual problem.   Musculoskeletal:  Negative for joint swelling and neck pain.   Neurological:  Positive for headaches. Negative for weakness.   Psychiatric/Behavioral:  Negative for confusion and dysphoric mood.         Past Medical History:   Diagnosis Date    Abnormal Pap smear of cervix 2003    Migraine headache         Review of patient's allergies indicates:   Allergen Reactions    Clarithromycin Rash and Palpitations     Other reaction(s): Hives / Skin Rash        Past Surgical History:   Procedure Laterality Date    CERVICAL BIOPSY  W/ LOOP ELECTRODE EXCISION      CHOLECYSTECTOMY      FALLOPIAN TUBOPLASTY Bilateral     TUBAL LIGATION  2003        Family History   Problem Relation Age of Onset    Arthritis Mother        Current Outpatient Medications   Medication Instructions    butalbital-acetaminophen-caffeine -40 mg (FIORICET, ESGIC) -40 mg per tablet 1 tablet, Oral, Every 6 hours PRN    folic acid (FOLVITE) 1 mg, Oral, Daily    PNV cmb#95-ferrous fumarate-FA (PRENATAL) 28 mg iron- 800 mcg Tab Oral        Objective     /84   Pulse 96   Ht 5' 6" (1.676 m)   Wt 91.3 kg (201 lb 3.2 oz)   LMP 11/10/2022 (Exact Date)   BMI 32.47 kg/m²     Physical Exam  Constitutional:       General: She is not in acute distress.     Appearance: Normal appearance. She is not ill-appearing.   HENT: "      Head: Normocephalic and atraumatic.      Right Ear: External ear normal.      Left Ear: External ear normal.   Eyes:      Extraocular Movements: Extraocular movements intact.      Conjunctiva/sclera: Conjunctivae normal.   Cardiovascular:      Rate and Rhythm: Normal rate.      Heart sounds: No murmur heard.  Pulmonary:      Effort: Pulmonary effort is normal.   Musculoskeletal:      Cervical back: Normal range of motion.   Skin:     General: Skin is warm and dry.      Coloration: Skin is not jaundiced.      Findings: No rash.   Neurological:      Mental Status: She is alert.   Psychiatric:         Mood and Affect: Mood normal.        All recently obtained labs have been reviewed and discussed in detail with the patient.   Assessment     1. Worsening headaches    2. 12 weeks gestation of pregnancy         Plan        Problem List Items Addressed This Visit          Neuro    Worsening headaches - Primary    Relevant Medications    butalbital-acetaminophen-caffeine -40 mg (FIORICET, ESGIC) -40 mg per tablet       Obstetric    12 weeks gestation of pregnancy       No follow-ups on file.    Patient advised that is symptoms worsen, they should call or report directly to local emergency department.    Everton Sims DO  [facility]

## 2023-09-07 ENCOUNTER — OFFICE VISIT (OUTPATIENT)
Dept: FAMILY MEDICINE | Facility: CLINIC | Age: 43
End: 2023-09-07
Payer: MEDICAID

## 2023-09-07 VITALS
BODY MASS INDEX: 32.62 KG/M2 | WEIGHT: 203 LBS | SYSTOLIC BLOOD PRESSURE: 108 MMHG | HEART RATE: 121 BPM | HEIGHT: 66 IN | DIASTOLIC BLOOD PRESSURE: 75 MMHG

## 2023-09-07 DIAGNOSIS — L30.9 DERMATITIS: Primary | ICD-10-CM

## 2023-09-07 PROCEDURE — 99213 PR OFFICE/OUTPT VISIT, EST, LEVL III, 20-29 MIN: ICD-10-PCS | Mod: ,,, | Performed by: FAMILY MEDICINE

## 2023-09-07 PROCEDURE — 99213 OFFICE O/P EST LOW 20 MIN: CPT | Mod: ,,, | Performed by: FAMILY MEDICINE

## 2023-09-07 PROCEDURE — 3044F PR MOST RECENT HEMOGLOBIN A1C LEVEL <7.0%: ICD-10-PCS | Mod: CPTII,,, | Performed by: FAMILY MEDICINE

## 2023-09-07 PROCEDURE — 3074F SYST BP LT 130 MM HG: CPT | Mod: CPTII,,, | Performed by: FAMILY MEDICINE

## 2023-09-07 PROCEDURE — 3078F DIAST BP <80 MM HG: CPT | Mod: CPTII,,, | Performed by: FAMILY MEDICINE

## 2023-09-07 PROCEDURE — 3008F PR BODY MASS INDEX (BMI) DOCUMENTED: ICD-10-PCS | Mod: CPTII,,, | Performed by: FAMILY MEDICINE

## 2023-09-07 PROCEDURE — 3008F BODY MASS INDEX DOCD: CPT | Mod: CPTII,,, | Performed by: FAMILY MEDICINE

## 2023-09-07 PROCEDURE — 1159F MED LIST DOCD IN RCRD: CPT | Mod: CPTII,,, | Performed by: FAMILY MEDICINE

## 2023-09-07 PROCEDURE — 3044F HG A1C LEVEL LT 7.0%: CPT | Mod: CPTII,,, | Performed by: FAMILY MEDICINE

## 2023-09-07 PROCEDURE — 3074F PR MOST RECENT SYSTOLIC BLOOD PRESSURE < 130 MM HG: ICD-10-PCS | Mod: CPTII,,, | Performed by: FAMILY MEDICINE

## 2023-09-07 PROCEDURE — 1159F PR MEDICATION LIST DOCUMENTED IN MEDICAL RECORD: ICD-10-PCS | Mod: CPTII,,, | Performed by: FAMILY MEDICINE

## 2023-09-07 PROCEDURE — 3078F PR MOST RECENT DIASTOLIC BLOOD PRESSURE < 80 MM HG: ICD-10-PCS | Mod: CPTII,,, | Performed by: FAMILY MEDICINE

## 2023-09-07 RX ORDER — HYDROCORTISONE 1 %
CREAM (GRAM) TOPICAL 2 TIMES DAILY
Qty: 15 G | Refills: 0 | Status: SHIPPED | OUTPATIENT
Start: 2023-09-07 | End: 2023-09-14

## 2023-09-07 NOTE — PROGRESS NOTES
"              Everton Sims IV, DO  [facility]  [address] [place] [address]  Phone: [phone]      Subjective     Name: Sabiha Dunbar   Sex: female  YOB: 1980 (42 y.o.)  MRN: [id number]  Visit Date: 09/07/2023   Chief Complaint: Rash (Has tiny bumps on hands and feet)        HISTORY OF PRESENT ILLNESS:    Chief Complaint   Patient presents with    Rash     Has tiny bumps on hands and feet       HPI  See tests that keep you healthy and the problem oriented documentation below.    Tests to Keep You Healthy    Mammogram: ORDERED BUT NOT SCHEDULED  Cervical Cancer Screening: Met on 3/29/2022      Portions of this note may have been created with voice recognition software. Occasional "wrong-word" or "sound-a-like" substitutions may have occurred due to the inherent limitations of voice recognition software. Please, read the note carefully and recognize, using context, where substitutions have occurred.     PAST MEDICAL HISTORY:  Significant Diagnoses - Patient  has a past medical history of Abnormal Pap smear of cervix (2003) and Migraine headache.  Medications - Patient has a current medication list which includes the following long-term medication(s): folic acid, pnv cmb#95-ferrous fumarate-fa, and hydrocortisone.   Allergies - Patient is allergic to clarithromycin.  Surgeries - Patient  has a past surgical history that includes Cervical biopsy w/ loop electrode excision; Cholecystectomy; Tubal ligation (2003); and Fallopian tuboplasty (Bilateral).  Family History - Patient family history includes Arthritis in her mother.      SOCIAL HISTORY:  Tobacco - Patient  reports that she has never smoked. She has never been exposed to tobacco smoke. She has never used smokeless tobacco.   Alcohol - Patient  reports current alcohol use of about 2.0 standard drinks of alcohol per week.   Recreational Drugs - Patient  reports no history of drug use.       Review of Systems   All " "other systems reviewed and are negative.       Past Medical History:   Diagnosis Date    Abnormal Pap smear of cervix 2003    Migraine headache         Review of patient's allergies indicates:   Allergen Reactions    Clarithromycin Rash and Palpitations     Other reaction(s): Hives / Skin Rash        Past Surgical History:   Procedure Laterality Date    CERVICAL BIOPSY  W/ LOOP ELECTRODE EXCISION      CHOLECYSTECTOMY      FALLOPIAN TUBOPLASTY Bilateral     TUBAL LIGATION  2003        Family History   Problem Relation Age of Onset    Arthritis Mother        Current Outpatient Medications   Medication Instructions    folic acid (FOLVITE) 1 mg, Oral, Daily    hydrocortisone 1 % cream Topical (Top), 2 times daily    PNV cmb#95-ferrous fumarate-FA (PRENATAL) 28 mg iron- 800 mcg Tab Oral        Objective     /75   Pulse (!) 121   Ht 5' 6" (1.676 m)   Wt 92.1 kg (203 lb)   LMP 11/10/2022 (Exact Date)   BMI 32.77 kg/m²     Physical Exam  Constitutional:       General: She is not in acute distress.     Appearance: Normal appearance. She is not ill-appearing.   HENT:      Head: Normocephalic and atraumatic.      Right Ear: External ear normal.      Left Ear: External ear normal.   Eyes:      Extraocular Movements: Extraocular movements intact.      Conjunctiva/sclera: Conjunctivae normal.   Cardiovascular:      Rate and Rhythm: Normal rate.      Heart sounds: No murmur heard.  Pulmonary:      Effort: Pulmonary effort is normal.   Musculoskeletal:      Cervical back: Normal range of motion.   Skin:     General: Skin is warm and dry.      Coloration: Skin is not jaundiced.      Findings: No rash.   Neurological:      Mental Status: She is alert.   Psychiatric:         Mood and Affect: Mood normal.        All recently obtained labs have been reviewed and discussed in detail with the patient.   Assessment     1. Dermatitis         Plan        Problem List Items Addressed This Visit    None  Visit Diagnoses  "      Dermatitis    -  Primary    Relevant Medications    hydrocortisone 1 % cream            No follow-ups on file.    Patient advised that is symptoms worsen, they should call or report directly to local emergency department.    Everton Sims DO  [facility]

## 2023-09-28 ENCOUNTER — OFFICE VISIT (OUTPATIENT)
Dept: FAMILY MEDICINE | Facility: CLINIC | Age: 43
End: 2023-09-28
Payer: MEDICAID

## 2023-09-28 VITALS
SYSTOLIC BLOOD PRESSURE: 109 MMHG | BODY MASS INDEX: 32.47 KG/M2 | HEIGHT: 66 IN | HEART RATE: 103 BPM | DIASTOLIC BLOOD PRESSURE: 73 MMHG | WEIGHT: 202 LBS

## 2023-09-28 DIAGNOSIS — H60.392 OTHER INFECTIVE ACUTE OTITIS EXTERNA OF LEFT EAR: Primary | ICD-10-CM

## 2023-09-28 PROBLEM — H60.502 ACUTE OTITIS EXTERNA OF LEFT EAR: Status: ACTIVE | Noted: 2023-09-28

## 2023-09-28 PROCEDURE — 3074F SYST BP LT 130 MM HG: CPT | Mod: CPTII,,, | Performed by: FAMILY MEDICINE

## 2023-09-28 PROCEDURE — 1159F PR MEDICATION LIST DOCUMENTED IN MEDICAL RECORD: ICD-10-PCS | Mod: CPTII,,, | Performed by: FAMILY MEDICINE

## 2023-09-28 PROCEDURE — 3078F PR MOST RECENT DIASTOLIC BLOOD PRESSURE < 80 MM HG: ICD-10-PCS | Mod: CPTII,,, | Performed by: FAMILY MEDICINE

## 2023-09-28 PROCEDURE — 3008F PR BODY MASS INDEX (BMI) DOCUMENTED: ICD-10-PCS | Mod: CPTII,,, | Performed by: FAMILY MEDICINE

## 2023-09-28 PROCEDURE — 1159F MED LIST DOCD IN RCRD: CPT | Mod: CPTII,,, | Performed by: FAMILY MEDICINE

## 2023-09-28 PROCEDURE — 99214 OFFICE O/P EST MOD 30 MIN: CPT | Mod: ,,, | Performed by: FAMILY MEDICINE

## 2023-09-28 PROCEDURE — 3008F BODY MASS INDEX DOCD: CPT | Mod: CPTII,,, | Performed by: FAMILY MEDICINE

## 2023-09-28 PROCEDURE — 99214 PR OFFICE/OUTPT VISIT, EST, LEVL IV, 30-39 MIN: ICD-10-PCS | Mod: ,,, | Performed by: FAMILY MEDICINE

## 2023-09-28 PROCEDURE — 3078F DIAST BP <80 MM HG: CPT | Mod: CPTII,,, | Performed by: FAMILY MEDICINE

## 2023-09-28 PROCEDURE — 3044F PR MOST RECENT HEMOGLOBIN A1C LEVEL <7.0%: ICD-10-PCS | Mod: CPTII,,, | Performed by: FAMILY MEDICINE

## 2023-09-28 PROCEDURE — 3044F HG A1C LEVEL LT 7.0%: CPT | Mod: CPTII,,, | Performed by: FAMILY MEDICINE

## 2023-09-28 PROCEDURE — 3074F PR MOST RECENT SYSTOLIC BLOOD PRESSURE < 130 MM HG: ICD-10-PCS | Mod: CPTII,,, | Performed by: FAMILY MEDICINE

## 2023-09-28 RX ORDER — CIPROFLOXACIN AND DEXAMETHASONE 3; 1 MG/ML; MG/ML
4 SUSPENSION/ DROPS AURICULAR (OTIC) 2 TIMES DAILY
Qty: 7.5 ML | Refills: 0 | Status: SHIPPED | OUTPATIENT
Start: 2023-09-28 | End: 2023-10-05

## 2023-09-28 NOTE — PROGRESS NOTES
"              Everton Sims IV, DO  [facility]  [address] [place] [address]  Phone: [phone]      Subjective     Name: Sabiha Dunbar   Sex: female  YOB: 1980 (42 y.o.)  MRN: [id number]  Visit Date: 09/28/2023   Chief Complaint: Otalgia and Cough (Developed a cough last night)        HISTORY OF PRESENT ILLNESS:    Chief Complaint   Patient presents with    Otalgia    Cough     Developed a cough last night       Patient was 1 acute illness with systemic symptoms.  Prescription medication management done today.  See tests that keep you healthy and the problem oriented documentation below.    Tests to Keep You Healthy    Mammogram: ORDERED BUT NOT SCHEDULED  Cervical Cancer Screening: Met on 3/29/2022      Portions of this note may have been created with voice recognition software. Occasional "wrong-word" or "sound-a-like" substitutions may have occurred due to the inherent limitations of voice recognition software. Please, read the note carefully and recognize, using context, where substitutions have occurred.     PAST MEDICAL HISTORY:  Significant Diagnoses - Patient  has a past medical history of Abnormal Pap smear of cervix (2003) and Migraine headache.  Medications - Patient has a current medication list which includes the following long-term medication(s): folic acid, pnv cmb#95-ferrous fumarate-fa, and hydrocortisone.   Allergies - Patient is allergic to clarithromycin.  Surgeries - Patient  has a past surgical history that includes Cervical biopsy w/ loop electrode excision; Cholecystectomy; Tubal ligation (2003); and Fallopian tuboplasty (Bilateral).  Family History - Patient family history includes Arthritis in her mother.      SOCIAL HISTORY:  Tobacco - Patient  reports that she has never smoked. She has never been exposed to tobacco smoke. She has never used smokeless tobacco.   Alcohol - Patient  reports current alcohol use of about 2.0 standard drinks " "of alcohol per week.   Recreational Drugs - Patient  reports no history of drug use.       Review of Systems   All other systems reviewed and are negative.       Past Medical History:   Diagnosis Date    Abnormal Pap smear of cervix 2003    Migraine headache         Review of patient's allergies indicates:   Allergen Reactions    Clarithromycin Rash and Palpitations     Other reaction(s): Hives / Skin Rash        Past Surgical History:   Procedure Laterality Date    CERVICAL BIOPSY  W/ LOOP ELECTRODE EXCISION      CHOLECYSTECTOMY      FALLOPIAN TUBOPLASTY Bilateral     TUBAL LIGATION  2003        Family History   Problem Relation Age of Onset    Arthritis Mother        Current Outpatient Medications   Medication Instructions    ciprofloxacin-dexAMETHasone 0.3-0.1% (CIPRODEX) 0.3-0.1 % DrpS 4 drops, Left Ear, 2 times daily    folic acid (FOLVITE) 1 mg, Oral, Daily    hydrocortisone 1 % cream Topical (Top), 2 times daily    PNV cmb#95-ferrous fumarate-FA (PRENATAL) 28 mg iron- 800 mcg Tab Oral        Objective     /73   Pulse 103   Ht 5' 6" (1.676 m)   Wt 91.6 kg (202 lb)   LMP 11/10/2022 (Exact Date)   BMI 32.60 kg/m²     Physical Exam  Constitutional:       General: She is not in acute distress.     Appearance: Normal appearance. She is not ill-appearing.   HENT:      Head: Normocephalic and atraumatic.      Right Ear: External ear normal.      Left Ear: External ear normal.   Eyes:      Extraocular Movements: Extraocular movements intact.      Conjunctiva/sclera: Conjunctivae normal.   Cardiovascular:      Rate and Rhythm: Normal rate.      Heart sounds: No murmur heard.  Pulmonary:      Effort: Pulmonary effort is normal.   Musculoskeletal:      Cervical back: Normal range of motion.   Skin:     General: Skin is warm and dry.      Coloration: Skin is not jaundiced.      Findings: No rash.   Neurological:      Mental Status: She is alert.   Psychiatric:         Mood and Affect: Mood normal. "        All recently obtained labs have been reviewed and discussed in detail with the patient.   Assessment     1. Other infective acute otitis externa of left ear         Plan        Problem List Items Addressed This Visit          ENT    Acute otitis externa of left ear - Primary     Mild otitis externa of the left ear.  Recommend Ciprodex were drops left ear 2 times daily call Monday if symptoms do not improve           Relevant Medications    ciprofloxacin-dexAMETHasone 0.3-0.1% (CIPRODEX) 0.3-0.1 % DrpS       No follow-ups on file.    Patient advised that is symptoms worsen, they should call or report directly to local emergency department.    Everton Sims,   [facility]

## 2023-09-28 NOTE — ASSESSMENT & PLAN NOTE
Mild otitis externa of the left ear.  Recommend Ciprodex were drops left ear 2 times daily call Monday if symptoms do not improve

## 2024-06-10 PROBLEM — Z3A.12 12 WEEKS GESTATION OF PREGNANCY: Status: RESOLVED | Noted: 2023-07-05 | Resolved: 2024-06-10

## 2024-06-17 ENCOUNTER — OFFICE VISIT (OUTPATIENT)
Dept: FAMILY MEDICINE | Facility: CLINIC | Age: 44
End: 2024-06-17
Payer: MEDICAID

## 2024-06-17 VITALS
WEIGHT: 202 LBS | BODY MASS INDEX: 32.47 KG/M2 | HEART RATE: 102 BPM | HEIGHT: 66 IN | SYSTOLIC BLOOD PRESSURE: 128 MMHG | DIASTOLIC BLOOD PRESSURE: 86 MMHG

## 2024-06-17 DIAGNOSIS — R50.9 FEVER, UNSPECIFIED FEVER CAUSE: ICD-10-CM

## 2024-06-17 DIAGNOSIS — R30.0 DYSURIA: Primary | ICD-10-CM

## 2024-06-17 DIAGNOSIS — N30.90 CYSTITIS: ICD-10-CM

## 2024-06-17 LAB
BILIRUB SERPL-MCNC: NORMAL MG/DL
BLOOD URINE, POC: NORMAL
COLOR, POC UA: NORMAL
GLUCOSE UR QL STRIP: 100
KETONES UR QL STRIP: NORMAL
LEUKOCYTE ESTERASE URINE, POC: NORMAL
NITRITE, POC UA: NORMAL
PH, POC UA: 5.5
PROTEIN, POC: 30
SPECIFIC GRAVITY, POC UA: 1.02
UROBILINOGEN, POC UA: 2

## 2024-06-17 PROCEDURE — 99214 OFFICE O/P EST MOD 30 MIN: CPT | Mod: ,,, | Performed by: FAMILY MEDICINE

## 2024-06-17 PROCEDURE — 3008F BODY MASS INDEX DOCD: CPT | Mod: CPTII,,, | Performed by: FAMILY MEDICINE

## 2024-06-17 PROCEDURE — 81003 URINALYSIS AUTO W/O SCOPE: CPT | Mod: RHCUB | Performed by: FAMILY MEDICINE

## 2024-06-17 PROCEDURE — 3079F DIAST BP 80-89 MM HG: CPT | Mod: CPTII,,, | Performed by: FAMILY MEDICINE

## 2024-06-17 PROCEDURE — 3074F SYST BP LT 130 MM HG: CPT | Mod: CPTII,,, | Performed by: FAMILY MEDICINE

## 2024-06-17 RX ORDER — NITROFURANTOIN 25; 75 MG/1; MG/1
100 CAPSULE ORAL 2 TIMES DAILY
Qty: 10 CAPSULE | Refills: 0 | Status: SHIPPED | OUTPATIENT
Start: 2024-06-17 | End: 2024-06-22

## 2024-06-17 RX ORDER — NITROFURANTOIN 25; 75 MG/1; MG/1
100 CAPSULE ORAL 2 TIMES DAILY
Qty: 10 CAPSULE | Refills: 0 | Status: SHIPPED | OUTPATIENT
Start: 2024-06-17 | End: 2024-06-17

## 2024-06-17 NOTE — PROGRESS NOTES
"              Everton Sims IV, DO  [facility]  [address] [place] [address]  Phone: [phone]      Subjective     Name: Sabiha Dunbar   Sex: female  YOB: 1980 (43 y.o.)  MRN: [id number]  Visit Date: 06/17/2024   Chief Complaint: Urinary Tract Infection (Painful urination, has been taking azo)        HISTORY OF PRESENT ILLNESS:    Chief Complaint   Patient presents with    Urinary Tract Infection     Painful urination, has been taking azo       HPI  See tests that keep you healthy and the problem oriented documentation below.    Tests to Keep You Healthy    Mammogram: ORDERED BUT NOT SCHEDULED  Cervical Cancer Screening: Met on 3/29/2022      Portions of this note may have been created with voice recognition software. Occasional "wrong-word" or "sound-a-like" substitutions may have occurred due to the inherent limitations of voice recognition software. Please, read the note carefully and recognize, using context, where substitutions have occurred.     PAST MEDICAL HISTORY:  Significant Diagnoses - Patient  has a past medical history of Abnormal Pap smear of cervix (2003) and Migraine headache.  Medications - Patient has a current medication list which includes the following long-term medication(s): hydrocortisone.   Allergies - Patient is allergic to clarithromycin.  Surgeries - Patient  has a past surgical history that includes Cervical biopsy w/ loop electrode excision; Cholecystectomy; Tubal ligation (2003); and Fallopian tuboplasty (Bilateral).  Family History - Patient family history includes Arthritis in her mother.      SOCIAL HISTORY:  Tobacco - Patient  reports that she has never smoked. She has never been exposed to tobacco smoke. She has never used smokeless tobacco.   Alcohol - Patient  reports current alcohol use of about 2.0 standard drinks of alcohol per week.   Recreational Drugs - Patient  reports no history of drug use.       Review of Systems " "  All other systems reviewed and are negative.       Past Medical History:   Diagnosis Date    Abnormal Pap smear of cervix 2003    Migraine headache         Review of patient's allergies indicates:   Allergen Reactions    Clarithromycin Rash and Palpitations     Other reaction(s): Hives / Skin Rash        Past Surgical History:   Procedure Laterality Date    CERVICAL BIOPSY  W/ LOOP ELECTRODE EXCISION      CHOLECYSTECTOMY      FALLOPIAN TUBOPLASTY Bilateral     TUBAL LIGATION  2003        Family History   Problem Relation Name Age of Onset    Arthritis Mother         Current Outpatient Medications   Medication Instructions    hydrocortisone 1 % cream Topical (Top), 2 times daily    nitrofurantoin, macrocrystal-monohydrate, (MACROBID) 100 MG capsule 100 mg, Oral, 2 times daily        Objective     /86   Pulse 102   Ht 5' 6" (1.676 m)   Wt 91.6 kg (202 lb)   BMI 32.60 kg/m²     Physical Exam  Constitutional:       General: She is not in acute distress.     Appearance: Normal appearance. She is not ill-appearing.   HENT:      Head: Normocephalic and atraumatic.      Right Ear: External ear normal.      Left Ear: External ear normal.   Eyes:      Extraocular Movements: Extraocular movements intact.      Conjunctiva/sclera: Conjunctivae normal.   Cardiovascular:      Rate and Rhythm: Normal rate.      Heart sounds: No murmur heard.  Pulmonary:      Effort: Pulmonary effort is normal.   Musculoskeletal:      Cervical back: Normal range of motion.   Skin:     General: Skin is warm and dry.      Coloration: Skin is not jaundiced.      Findings: No rash.   Neurological:      Mental Status: She is alert.   Psychiatric:         Mood and Affect: Mood normal.        All recently obtained labs have been reviewed and discussed in detail with the patient.   Assessment     1. Dysuria    2. Fever, unspecified fever cause    3. Cystitis         Plan        Problem List Items Addressed This Visit          Renal/ "    Cystitis     Acute issue with systemic symptoms including fever.  Recommend Macrobid p.o. b.i.d. for 5 days          Other Visit Diagnoses       Dysuria    -  Primary    Relevant Medications    nitrofurantoin, macrocrystal-monohydrate, (MACROBID) 100 MG capsule    Other Relevant Orders    POCT URINALYSIS W/O SCOPE (Completed)    Fever, unspecified fever cause                No follow-ups on file.    Patient advised that is symptoms worsen, they should call or report directly to local emergency department.    Everton Sims,   [facility]

## 2024-08-06 ENCOUNTER — OFFICE VISIT (OUTPATIENT)
Dept: FAMILY MEDICINE | Facility: CLINIC | Age: 44
End: 2024-08-06
Payer: MEDICAID

## 2024-08-06 VITALS
HEART RATE: 125 BPM | DIASTOLIC BLOOD PRESSURE: 84 MMHG | HEIGHT: 66 IN | WEIGHT: 207.88 LBS | BODY MASS INDEX: 33.41 KG/M2 | SYSTOLIC BLOOD PRESSURE: 125 MMHG

## 2024-08-06 DIAGNOSIS — R09.89 CHEST CONGESTION: ICD-10-CM

## 2024-08-06 DIAGNOSIS — Z11.52 ENCOUNTER FOR SCREENING FOR COVID-19: Primary | ICD-10-CM

## 2024-08-06 DIAGNOSIS — R50.9 FEVER, UNSPECIFIED FEVER CAUSE: ICD-10-CM

## 2024-08-06 DIAGNOSIS — Z20.9 EXPOSURE TO COMMUNICABLE DISEASE: ICD-10-CM

## 2024-08-06 LAB
CTP QC/QA: YES
CTP QC/QA: YES
POC MOLECULAR INFLUENZA A AGN: NEGATIVE
POC MOLECULAR INFLUENZA B AGN: NEGATIVE
SARS-COV-2 RDRP RESP QL NAA+PROBE: NEGATIVE

## 2024-08-06 PROCEDURE — 87635 SARS-COV-2 COVID-19 AMP PRB: CPT | Mod: RHCUB | Performed by: FAMILY MEDICINE

## 2024-08-06 PROCEDURE — 87502 INFLUENZA DNA AMP PROBE: CPT | Mod: RHCUB | Performed by: FAMILY MEDICINE

## 2024-08-06 PROCEDURE — 99214 OFFICE O/P EST MOD 30 MIN: CPT | Mod: ,,, | Performed by: FAMILY MEDICINE

## 2024-08-06 PROCEDURE — 3074F SYST BP LT 130 MM HG: CPT | Mod: CPTII,,, | Performed by: FAMILY MEDICINE

## 2024-08-06 PROCEDURE — 3008F BODY MASS INDEX DOCD: CPT | Mod: CPTII,,, | Performed by: FAMILY MEDICINE

## 2024-08-06 PROCEDURE — 3079F DIAST BP 80-89 MM HG: CPT | Mod: CPTII,,, | Performed by: FAMILY MEDICINE

## 2024-08-06 PROCEDURE — 1159F MED LIST DOCD IN RCRD: CPT | Mod: CPTII,,, | Performed by: FAMILY MEDICINE

## 2024-08-06 RX ORDER — AMOXICILLIN AND CLAVULANATE POTASSIUM 875; 125 MG/1; MG/1
1 TABLET, FILM COATED ORAL EVERY 12 HOURS
Qty: 14 TABLET | Refills: 0 | Status: SHIPPED | OUTPATIENT
Start: 2024-08-06 | End: 2024-08-13

## 2024-08-06 RX ORDER — METHYLPREDNISOLONE 4 MG/1
TABLET ORAL
Qty: 21 EACH | Refills: 0 | Status: SHIPPED | OUTPATIENT
Start: 2024-08-06 | End: 2024-08-27

## 2024-09-18 ENCOUNTER — OFFICE VISIT (OUTPATIENT)
Dept: FAMILY MEDICINE | Facility: CLINIC | Age: 44
End: 2024-09-18
Payer: MEDICAID

## 2024-09-18 VITALS
DIASTOLIC BLOOD PRESSURE: 78 MMHG | BODY MASS INDEX: 32.47 KG/M2 | HEIGHT: 66 IN | WEIGHT: 202 LBS | HEART RATE: 108 BPM | SYSTOLIC BLOOD PRESSURE: 117 MMHG

## 2024-09-18 DIAGNOSIS — R05.1 ACUTE COUGH: Primary | ICD-10-CM

## 2024-09-18 DIAGNOSIS — R06.2 WHEEZING: ICD-10-CM

## 2024-09-18 DIAGNOSIS — R50.9 SUBJECTIVE FEVER: ICD-10-CM

## 2024-09-18 DIAGNOSIS — R06.02 SHORTNESS OF BREATH: ICD-10-CM

## 2024-09-18 RX ORDER — AMOXICILLIN AND CLAVULANATE POTASSIUM 875; 125 MG/1; MG/1
1 TABLET, FILM COATED ORAL EVERY 12 HOURS
Qty: 14 TABLET | Refills: 0 | Status: SHIPPED | OUTPATIENT
Start: 2024-09-18 | End: 2024-09-25

## 2024-09-18 RX ORDER — METHYLPREDNISOLONE 4 MG/1
TABLET ORAL
Qty: 21 EACH | Refills: 0 | Status: SHIPPED | OUTPATIENT
Start: 2024-09-18 | End: 2024-10-09

## 2024-09-18 RX ORDER — CODEINE PHOSPHATE AND GUAIFENESIN 10; 100 MG/5ML; MG/5ML
5 SOLUTION ORAL 3 TIMES DAILY PRN
Qty: 118 ML | Refills: 0 | Status: SHIPPED | OUTPATIENT
Start: 2024-09-18 | End: 2024-09-28

## 2024-09-19 NOTE — PROGRESS NOTES
"              Everton Sims IV, DO  [facility]  [address] [place] [address]  Phone: [phone]      Subjective     Name: Sabiha Dunbar   Sex: female  YOB: 1980 (43 y.o.)  MRN: [id number]  Visit Date: 09/19/2024   Chief Complaint: Sore Throat, Cough, and Sinus Problem        HISTORY OF PRESENT ILLNESS:    Chief Complaint   Patient presents with    Sore Throat    Cough    Sinus Problem       Acute issue was systemic symptoms including wheezing and fever.  See tests that keep you healthy and the problem oriented documentation below.    Tests to Keep You Healthy    Mammogram: ORDERED BUT NOT SCHEDULED  Cervical Cancer Screening: Met on 3/29/2022      Portions of this note may have been created with voice recognition software. Occasional "wrong-word" or "sound-a-like" substitutions may have occurred due to the inherent limitations of voice recognition software. Please, read the note carefully and recognize, using context, where substitutions have occurred.     PAST MEDICAL HISTORY:  Significant Diagnoses - Patient  has a past medical history of Abnormal Pap smear of cervix (2003) and Migraine headache.  Medications - Patient is not on any long-term medications.   Allergies - Patient is allergic to clarithromycin.  Surgeries - Patient  has a past surgical history that includes Cervical biopsy w/ loop electrode excision; Cholecystectomy; Tubal ligation (2003); and Fallopian tuboplasty (Bilateral).  Family History - Patient family history includes Arthritis in her mother.      SOCIAL HISTORY:  Tobacco - Patient  reports that she has never smoked. She has never been exposed to tobacco smoke. She has never used smokeless tobacco.   Alcohol - Patient  reports current alcohol use of about 2.0 standard drinks of alcohol per week.   Recreational Drugs - Patient  reports no history of drug use.       Review of Systems   All other systems reviewed and are negative.       Past " "Medical History:   Diagnosis Date    Abnormal Pap smear of cervix 2003    Migraine headache         Review of patient's allergies indicates:   Allergen Reactions    Clarithromycin Rash and Palpitations     Other reaction(s): Hives / Skin Rash        Past Surgical History:   Procedure Laterality Date    CERVICAL BIOPSY  W/ LOOP ELECTRODE EXCISION      CHOLECYSTECTOMY      FALLOPIAN TUBOPLASTY Bilateral     TUBAL LIGATION  2003        Family History   Problem Relation Name Age of Onset    Arthritis Mother         Current Outpatient Medications   Medication Instructions    amoxicillin-clavulanate 875-125mg (AUGMENTIN) 875-125 mg per tablet 1 tablet, Oral, Every 12 hours    guaiFENesin-codeine 100-10 mg/5 ml (TUSSI-ORGANIDIN NR)  mg/5 mL syrup 5 mLs, Oral, 3 times daily PRN    methylPREDNISolone (MEDROL DOSEPACK) 4 mg tablet use as directed        Objective     /78   Pulse 108   Ht 5' 6" (1.676 m)   Wt 91.6 kg (202 lb)   BMI 32.60 kg/m²     Physical Exam  Constitutional:       General: She is not in acute distress.     Appearance: Normal appearance. She is not ill-appearing.   HENT:      Head: Normocephalic and atraumatic.      Right Ear: External ear normal.      Left Ear: External ear normal.   Eyes:      Extraocular Movements: Extraocular movements intact.      Conjunctiva/sclera: Conjunctivae normal.   Cardiovascular:      Rate and Rhythm: Normal rate.      Heart sounds: No murmur heard.  Pulmonary:      Effort: Pulmonary effort is normal.   Musculoskeletal:      Cervical back: Normal range of motion.   Skin:     General: Skin is warm and dry.      Coloration: Skin is not jaundiced.      Findings: No rash.   Neurological:      Mental Status: She is alert.   Psychiatric:         Mood and Affect: Mood normal.        All recently obtained labs have been reviewed and discussed in detail with the patient.   Assessment     1. Acute cough    2. Wheezing    3. Shortness of breath    4. Subjective " fever         Plan        Problem List Items Addressed This Visit    None  Visit Diagnoses       Acute cough    -  Primary    Relevant Medications    guaiFENesin-codeine 100-10 mg/5 ml (TUSSI-ORGANIDIN NR)  mg/5 mL syrup    Wheezing        Relevant Medications    methylPREDNISolone (MEDROL DOSEPACK) 4 mg tablet    Shortness of breath        Subjective fever        Relevant Medications    amoxicillin-clavulanate 875-125mg (AUGMENTIN) 875-125 mg per tablet            No follow-ups on file.    Patient advised that is symptoms worsen, they should call or report directly to local emergency department.    Everton Sims DO  [facility]

## 2024-11-07 ENCOUNTER — OFFICE VISIT (OUTPATIENT)
Dept: FAMILY MEDICINE | Facility: CLINIC | Age: 44
End: 2024-11-07
Payer: MEDICAID

## 2024-11-07 VITALS
HEIGHT: 66 IN | SYSTOLIC BLOOD PRESSURE: 112 MMHG | HEART RATE: 103 BPM | BODY MASS INDEX: 32.47 KG/M2 | WEIGHT: 202 LBS | DIASTOLIC BLOOD PRESSURE: 75 MMHG

## 2024-11-07 DIAGNOSIS — M25.531 RIGHT WRIST PAIN: Primary | ICD-10-CM

## 2024-11-07 RX ORDER — HYDROCODONE BITARTRATE AND ACETAMINOPHEN 5; 325 MG/1; MG/1
1 TABLET ORAL EVERY 6 HOURS PRN
Qty: 24 TABLET | Refills: 0 | Status: SHIPPED | OUTPATIENT
Start: 2024-11-07 | End: 2024-11-13

## 2024-11-07 NOTE — PROGRESS NOTES
"              Everton Sims IV, DO  [facility]  [address] [place] [address]  Phone: [phone]      Subjective     Name: Sabiha Dunbar   Sex: female  YOB: 1980 (44 y.o.)  MRN: [id number]  Visit Date: 11/07/2024   Chief Complaint: Wrist Pain        HISTORY OF PRESENT ILLNESS:    Chief Complaint   Patient presents with    Wrist Pain       HPI  See tests that keep you healthy and the problem oriented documentation below.    Tests to Keep You Healthy    Mammogram: ORDERED BUT NOT SCHEDULED  Cervical Cancer Screening: Met on 3/29/2022      Portions of this note may have been created with voice recognition software. Occasional "wrong-word" or "sound-a-like" substitutions may have occurred due to the inherent limitations of voice recognition software. Please, read the note carefully and recognize, using context, where substitutions have occurred.     PAST MEDICAL HISTORY:  Significant Diagnoses - Patient  has a past medical history of Abnormal Pap smear of cervix (2003) and Migraine headache.  Medications - Patient is not on any long-term medications.   Allergies - Patient is allergic to clarithromycin.  Surgeries - Patient  has a past surgical history that includes Cervical biopsy w/ loop electrode excision; Cholecystectomy; Tubal ligation (2003); and Fallopian tuboplasty (Bilateral).  Family History - Patient family history includes Arthritis in her mother.      SOCIAL HISTORY:  Tobacco - Patient  reports that she has never smoked. She has never been exposed to tobacco smoke. She has never used smokeless tobacco.   Alcohol - Patient  reports current alcohol use of about 2.0 standard drinks of alcohol per week.   Recreational Drugs - Patient  reports no history of drug use.       Review of Systems   All other systems reviewed and are negative.       Past Medical History:   Diagnosis Date    Abnormal Pap smear of cervix 2003    Migraine headache         Review of " "patient's allergies indicates:   Allergen Reactions    Clarithromycin Rash and Palpitations     Other reaction(s): Hives / Skin Rash        Past Surgical History:   Procedure Laterality Date    CERVICAL BIOPSY  W/ LOOP ELECTRODE EXCISION      CHOLECYSTECTOMY      FALLOPIAN TUBOPLASTY Bilateral     TUBAL LIGATION  2003        Family History   Problem Relation Name Age of Onset    Arthritis Mother         Current Outpatient Medications   Medication Instructions    HYDROcodone-acetaminophen (NORCO) 5-325 mg per tablet 1 tablet, Oral, Every 6 hours PRN        Objective     /75 (BP Location: Right arm, Patient Position: Sitting)   Pulse 103   Ht 5' 6" (1.676 m)   Wt 91.6 kg (202 lb)   BMI 32.60 kg/m²     Physical Exam  Constitutional:       General: She is not in acute distress.     Appearance: Normal appearance. She is not ill-appearing.   HENT:      Head: Normocephalic and atraumatic.      Right Ear: External ear normal.      Left Ear: External ear normal.   Eyes:      Extraocular Movements: Extraocular movements intact.      Conjunctiva/sclera: Conjunctivae normal.   Cardiovascular:      Rate and Rhythm: Normal rate.      Heart sounds: No murmur heard.  Pulmonary:      Effort: Pulmonary effort is normal.   Musculoskeletal:      Cervical back: Normal range of motion.   Skin:     General: Skin is warm and dry.      Coloration: Skin is not jaundiced.      Findings: No rash.   Neurological:      Mental Status: She is alert.   Psychiatric:         Mood and Affect: Mood normal.        All recently obtained labs have been reviewed and discussed in detail with the patient.   Assessment     1. Right wrist pain         Plan        Problem List Items Addressed This Visit    None  Visit Diagnoses       Right wrist pain    -  Primary    Relevant Medications    HYDROcodone-acetaminophen (NORCO) 5-325 mg per tablet            No follow-ups on file.    Patient advised that is symptoms worsen, they should call or " report directly to local emergency department.    Everton Sims DO  [facility]

## 2024-12-02 ENCOUNTER — OFFICE VISIT (OUTPATIENT)
Dept: FAMILY MEDICINE | Facility: CLINIC | Age: 44
End: 2024-12-02
Payer: MEDICAID

## 2024-12-02 VITALS
BODY MASS INDEX: 32.47 KG/M2 | SYSTOLIC BLOOD PRESSURE: 120 MMHG | WEIGHT: 202 LBS | HEIGHT: 66 IN | HEART RATE: 88 BPM | DIASTOLIC BLOOD PRESSURE: 80 MMHG

## 2024-12-02 DIAGNOSIS — H66.92 LEFT OTITIS MEDIA, UNSPECIFIED OTITIS MEDIA TYPE: ICD-10-CM

## 2024-12-02 DIAGNOSIS — J06.9 VIRAL URI WITH COUGH: Primary | ICD-10-CM

## 2024-12-02 DIAGNOSIS — R06.00 DYSPNEA, UNSPECIFIED TYPE: ICD-10-CM

## 2024-12-02 RX ORDER — AMOXICILLIN AND CLAVULANATE POTASSIUM 875; 125 MG/1; MG/1
1 TABLET, FILM COATED ORAL EVERY 12 HOURS
Qty: 14 TABLET | Refills: 0 | Status: SHIPPED | OUTPATIENT
Start: 2024-12-02 | End: 2024-12-09

## 2024-12-02 RX ORDER — AMOXICILLIN AND CLAVULANATE POTASSIUM 875; 125 MG/1; MG/1
1 TABLET, FILM COATED ORAL EVERY 12 HOURS
Qty: 14 TABLET | Refills: 0 | Status: SHIPPED | OUTPATIENT
Start: 2024-12-02 | End: 2024-12-02

## 2024-12-02 RX ORDER — FLUTICASONE PROPIONATE 50 MCG
2 SPRAY, SUSPENSION (ML) NASAL DAILY
COMMUNITY
Start: 2024-11-30

## 2024-12-02 RX ORDER — AMOXICILLIN AND CLAVULANATE POTASSIUM 875; 125 MG/1; MG/1
1 TABLET, FILM COATED ORAL EVERY 12 HOURS
Qty: 20 TABLET | Refills: 0 | Status: SHIPPED | OUTPATIENT
Start: 2024-12-02 | End: 2024-12-02

## 2024-12-02 NOTE — ASSESSMENT & PLAN NOTE
Pain in L ear since last night  L tympanic membrane is erythematous and injected   Start augmentin 875-125 mg, 1 tab PO BID for 7 days  Follow up in 1 week if symptoms do not improve

## 2024-12-02 NOTE — ASSESSMENT & PLAN NOTE
Productive cough with brown sputum, low grade fever, and dyspnea  Coarse breath sounds on exam  CXR pending  Start augmentin 8575-125 mg, 1 tab PO BID for 7 days  Follow up in 1 week if symptoms do not improve

## 2024-12-02 NOTE — PROGRESS NOTES
Subjective     Patient ID: Sabiha Dunbar is a 44 y.o. female.    Chief Complaint: Cough, Sinus Problem, and Otalgia (Ear and lymph nodes hurt)    Patient is a 43 y/o F who presents with productive cough, headache, and b/l ear pain. Pt reports a productive cough with brown sputum, she is unsure of when the cough started but it has been present during the holiday. Pt states she has tried OTC cough medicine and prescription cough medicine. Nothing has improved her symptoms. She developed over the weekend pain in her jaw line and b/l ear pain with the L ear being more severe. She has evaluated at the urgent care on 11/30/2024 and was found to be negative for COVID & Influenza A & B. She has sick contacts at home who also tested negative.     Review of Systems   Constitutional:  Positive for fever.   HENT:  Positive for ear pain and postnasal drip. Negative for nasal congestion and sore throat.    Respiratory:  Positive for cough and shortness of breath.    Cardiovascular: Negative.    Gastrointestinal: Negative.    Neurological:  Positive for headaches.          Objective     Physical Exam  Constitutional:       Appearance: Normal appearance.   HENT:      Right Ear: Tympanic membrane normal. There is no impacted cerumen.      Left Ear: There is no impacted cerumen. Tympanic membrane is injected, erythematous and bulging.      Nose: Nose normal.      Mouth/Throat:      Pharynx: Oropharynx is clear.   Eyes:      Conjunctiva/sclera: Conjunctivae normal.   Cardiovascular:      Rate and Rhythm: Normal rate and regular rhythm.   Pulmonary:      Effort: Pulmonary effort is normal.      Breath sounds: Normal air entry.      Comments: Coarse breath sounds  Abdominal:      General: Bowel sounds are normal.      Palpations: Abdomen is soft.   Skin:     General: Skin is warm.      Capillary Refill: Capillary refill takes less than 2 seconds.   Neurological:      Mental Status: She is alert.          Assessment and Plan     1.  Viral URI with cough  Assessment & Plan:  Productive cough with brown sputum, low grade fever, and dyspnea  Coarse breath sounds on exam  CXR pending  Start augmentin 8575-125 mg, 1 tab PO BID for 7 days  Follow up in 1 week if symptoms do not improve    Orders:  -     X-Ray Chest PA And Lateral; Future; Expected date: 12/02/2024  -     Discontinue: amoxicillin-clavulanate 875-125mg (AUGMENTIN) 875-125 mg per tablet; Take 1 tablet by mouth every 12 (twelve) hours. for 10 days  Dispense: 20 tablet; Refill: 0  -     Discontinue: amoxicillin-clavulanate 875-125mg (AUGMENTIN) 875-125 mg per tablet; Take 1 tablet by mouth every 12 (twelve) hours. for 7 days  Dispense: 14 tablet; Refill: 0  -     amoxicillin-clavulanate 875-125mg (AUGMENTIN) 875-125 mg per tablet; Take 1 tablet by mouth every 12 (twelve) hours. for 7 days  Dispense: 14 tablet; Refill: 0    2. Left otitis media, unspecified otitis media type  Assessment & Plan:  Pain in L ear since last night  L tympanic membrane is erythematous and injected   Start augmentin 875-125 mg, 1 tab PO BID for 7 days  Follow up in 1 week if symptoms do not improve     Orders:  -     Discontinue: amoxicillin-clavulanate 875-125mg (AUGMENTIN) 875-125 mg per tablet; Take 1 tablet by mouth every 12 (twelve) hours. for 10 days  Dispense: 20 tablet; Refill: 0  -     Discontinue: amoxicillin-clavulanate 875-125mg (AUGMENTIN) 875-125 mg per tablet; Take 1 tablet by mouth every 12 (twelve) hours. for 7 days  Dispense: 14 tablet; Refill: 0  -     amoxicillin-clavulanate 875-125mg (AUGMENTIN) 875-125 mg per tablet; Take 1 tablet by mouth every 12 (twelve) hours. for 7 days  Dispense: 14 tablet; Refill: 0    3. Dyspnea, unspecified type  -     X-Ray Chest PA And Lateral; Future; Expected date: 12/02/2024          Instructed patient that if symptoms fail to improve or worsen patient should seek immediate medical attention or report to the nearest emergency department. Patient expressed  verbal agreement and understanding to this plan of care.            Follow up if symptoms worsen or fail to improve.

## 2025-04-07 ENCOUNTER — OFFICE VISIT (OUTPATIENT)
Dept: FAMILY MEDICINE | Facility: CLINIC | Age: 45
End: 2025-04-07
Payer: MEDICAID

## 2025-04-07 VITALS
WEIGHT: 203 LBS | DIASTOLIC BLOOD PRESSURE: 76 MMHG | BODY MASS INDEX: 32.62 KG/M2 | SYSTOLIC BLOOD PRESSURE: 113 MMHG | HEIGHT: 66 IN | HEART RATE: 87 BPM

## 2025-04-07 DIAGNOSIS — J02.9 SORE THROAT: ICD-10-CM

## 2025-04-07 DIAGNOSIS — J32.9 SINUSITIS, UNSPECIFIED CHRONICITY, UNSPECIFIED LOCATION: ICD-10-CM

## 2025-04-07 DIAGNOSIS — H65.03 NON-RECURRENT ACUTE SEROUS OTITIS MEDIA OF BOTH EARS: Primary | ICD-10-CM

## 2025-04-07 LAB
CTP QC/QA: YES
MOLECULAR STREP A: NEGATIVE

## 2025-04-07 PROCEDURE — 99213 OFFICE O/P EST LOW 20 MIN: CPT | Mod: ,,,

## 2025-04-07 PROCEDURE — 87651 STREP A DNA AMP PROBE: CPT | Mod: RHCUB

## 2025-04-07 PROCEDURE — 3008F BODY MASS INDEX DOCD: CPT | Mod: CPTII,,,

## 2025-04-07 PROCEDURE — 3078F DIAST BP <80 MM HG: CPT | Mod: CPTII,,,

## 2025-04-07 PROCEDURE — 3074F SYST BP LT 130 MM HG: CPT | Mod: CPTII,,,

## 2025-04-07 PROCEDURE — 1159F MED LIST DOCD IN RCRD: CPT | Mod: CPTII,,,

## 2025-04-07 RX ORDER — AMOXICILLIN 500 MG/1
500 TABLET, FILM COATED ORAL EVERY 12 HOURS
Qty: 20 TABLET | Refills: 0 | Status: SHIPPED | OUTPATIENT
Start: 2025-04-07 | End: 2025-04-17

## 2025-04-07 RX ORDER — METHYLPREDNISOLONE 4 MG/1
TABLET ORAL
Qty: 21 EACH | Refills: 0 | Status: SHIPPED | OUTPATIENT
Start: 2025-04-07 | End: 2025-04-28

## 2025-04-07 NOTE — PROGRESS NOTES
Subjective     Patient ID: Sabiha Dunbar is a 44 y.o. female.    Chief Complaint: Otalgia, Sore Throat, and Health Maintenance (Hepatitis C Screening Never done/TETANUS VACCINE Never done/Mammogram due on 04/12/2023/Influenza Vaccine(1) Never done/COVID-19 Vaccine(1 - 2024-25 season) Never done/Refused vaccines)    Presents to clinic with bilateral ear pain starting over past several days.       Review of Systems   Constitutional:  Positive for fever (low grade). Negative for chills and fatigue.   HENT:  Positive for nasal congestion, ear pain and sore throat. Negative for ear discharge, facial swelling, rhinorrhea, sinus pressure/congestion and sneezing.    Eyes:  Negative for visual disturbance.   Respiratory:  Negative for cough, chest tightness, shortness of breath and wheezing.    Gastrointestinal:  Negative for abdominal pain, nausea and vomiting.   Genitourinary:  Negative for decreased urine volume.   Musculoskeletal:  Negative for myalgias.   Neurological:  Negative for weakness and headaches.   Hematological:  Negative for adenopathy.          Objective     Physical Exam  Vitals and nursing note reviewed.   Constitutional:       Appearance: Normal appearance.   HENT:      Right Ear: Tenderness present. A middle ear effusion is present. Tympanic membrane is erythematous and bulging.      Left Ear: Tenderness present. A middle ear effusion is present. Tympanic membrane is erythematous and bulging.      Nose: Congestion present. No rhinorrhea.      Mouth/Throat:      Mouth: Mucous membranes are moist.      Pharynx: No oropharyngeal exudate or posterior oropharyngeal erythema.   Cardiovascular:      Rate and Rhythm: Normal rate and regular rhythm.      Pulses: Normal pulses.      Heart sounds: Normal heart sounds.   Pulmonary:      Effort: Pulmonary effort is normal.      Breath sounds: Normal breath sounds.   Musculoskeletal:         General: Normal range of motion.      Cervical back: Normal range of  motion and neck supple.   Skin:     General: Skin is warm and dry.      Capillary Refill: Capillary refill takes less than 2 seconds.   Neurological:      General: No focal deficit present.      Mental Status: She is alert and oriented to person, place, and time.            Assessment and Plan     1. Non-recurrent acute serous otitis media of both ears  -     methylPREDNISolone (MEDROL DOSEPACK) 4 mg tablet; use as directed  Dispense: 21 each; Refill: 0  -     amoxicillin (AMOXIL) 500 MG Tab; Take 1 tablet (500 mg total) by mouth every 12 (twelve) hours. for 10 days  Dispense: 20 tablet; Refill: 0    2. Sore throat  -     POCT Strep A, Molecular    3. Sinusitis, unspecified chronicity, unspecified location  -     methylPREDNISolone (MEDROL DOSEPACK) 4 mg tablet; use as directed  Dispense: 21 each; Refill: 0  -     amoxicillin (AMOXIL) 500 MG Tab; Take 1 tablet (500 mg total) by mouth every 12 (twelve) hours. for 10 days  Dispense: 20 tablet; Refill: 0        Meds as prescribed  OTC meds as indicated and per instructions  Use saline nose sprays  Cool mist humidifier with distilled water  Warm salt water gargles  Warm tea with honey and lemon  Chloraseptic spray OTC.   Tylenol/ibuprofen for pain/fever greater than 100.4  Increase water intake.            Follow up if symptoms worsen or fail to improve.    I spent a total of 15 minutes on the day of the visit.This includes face to face time and non-face to face time preparing to see the patient (eg, review of tests), obtaining and/or reviewing separately obtained history, documenting clinical information in the electronic or other health record, independently interpreting results and communicating results to the patient/family/caregiver, or care coordinator.    ROSETTA Russ

## 2025-05-01 ENCOUNTER — OFFICE VISIT (OUTPATIENT)
Dept: FAMILY MEDICINE | Facility: CLINIC | Age: 45
End: 2025-05-01
Payer: MEDICAID

## 2025-05-01 VITALS
WEIGHT: 202 LBS | HEIGHT: 66 IN | HEART RATE: 93 BPM | DIASTOLIC BLOOD PRESSURE: 86 MMHG | SYSTOLIC BLOOD PRESSURE: 133 MMHG | BODY MASS INDEX: 32.47 KG/M2

## 2025-05-01 DIAGNOSIS — Z12.39 ENCOUNTER FOR SCREENING FOR MALIGNANT NEOPLASM OF BREAST, UNSPECIFIED SCREENING MODALITY: Primary | ICD-10-CM

## 2025-05-01 DIAGNOSIS — J22 LOWER RESPIRATORY INFECTION: ICD-10-CM

## 2025-05-01 DIAGNOSIS — H65.06 RECURRENT ACUTE SEROUS OTITIS MEDIA OF BOTH EARS: ICD-10-CM

## 2025-05-01 DIAGNOSIS — J98.01 BRONCHOSPASM: ICD-10-CM

## 2025-05-01 RX ORDER — METHYLPREDNISOLONE ACETATE 40 MG/ML
40 INJECTION, SUSPENSION INTRA-ARTICULAR; INTRALESIONAL; INTRAMUSCULAR; SOFT TISSUE
Status: COMPLETED | OUTPATIENT
Start: 2025-05-01 | End: 2025-05-01

## 2025-05-01 RX ORDER — IPRATROPIUM BROMIDE AND ALBUTEROL SULFATE 2.5; .5 MG/3ML; MG/3ML
3 SOLUTION RESPIRATORY (INHALATION)
Status: COMPLETED | OUTPATIENT
Start: 2025-05-01 | End: 2025-05-01

## 2025-05-01 RX ORDER — DEXAMETHASONE SODIUM PHOSPHATE 4 MG/ML
4 INJECTION, SOLUTION INTRA-ARTICULAR; INTRALESIONAL; INTRAMUSCULAR; INTRAVENOUS; SOFT TISSUE
Status: COMPLETED | OUTPATIENT
Start: 2025-05-01 | End: 2025-05-01

## 2025-05-01 RX ORDER — AMOXICILLIN AND CLAVULANATE POTASSIUM 875; 125 MG/1; MG/1
1 TABLET, FILM COATED ORAL EVERY 12 HOURS
Qty: 20 TABLET | Refills: 0 | Status: SHIPPED | OUTPATIENT
Start: 2025-05-01

## 2025-05-01 RX ORDER — BENZONATATE 200 MG/1
200 CAPSULE ORAL 3 TIMES DAILY PRN
Qty: 30 CAPSULE | Refills: 0 | Status: SHIPPED | OUTPATIENT
Start: 2025-05-01 | End: 2025-05-11

## 2025-05-01 RX ORDER — GUAIFENESIN 600 MG/1
1200 TABLET, EXTENDED RELEASE ORAL 2 TIMES DAILY
Qty: 40 TABLET | Refills: 0 | Status: SHIPPED | OUTPATIENT
Start: 2025-05-01

## 2025-05-01 RX ORDER — PREDNISONE 20 MG/1
20 TABLET ORAL DAILY
Qty: 7 TABLET | Refills: 0 | Status: SHIPPED | OUTPATIENT
Start: 2025-05-01

## 2025-05-01 RX ORDER — ALBUTEROL SULFATE 90 UG/1
2 INHALANT RESPIRATORY (INHALATION) EVERY 6 HOURS PRN
Qty: 18 G | Refills: 0 | Status: SHIPPED | OUTPATIENT
Start: 2025-05-01 | End: 2026-05-01

## 2025-05-01 RX ADMIN — DEXAMETHASONE SODIUM PHOSPHATE 4 MG: 4 INJECTION, SOLUTION INTRA-ARTICULAR; INTRALESIONAL; INTRAMUSCULAR; INTRAVENOUS; SOFT TISSUE at 11:05

## 2025-05-01 RX ADMIN — METHYLPREDNISOLONE ACETATE 40 MG: 40 INJECTION, SUSPENSION INTRA-ARTICULAR; INTRALESIONAL; INTRAMUSCULAR; SOFT TISSUE at 11:05

## 2025-05-01 RX ADMIN — IPRATROPIUM BROMIDE AND ALBUTEROL SULFATE 3 ML: 2.5; .5 SOLUTION RESPIRATORY (INHALATION) at 10:05

## 2025-05-01 NOTE — PROGRESS NOTES
Subjective     Patient ID: Sabiha Dunbar is a 44 y.o. female.    Chief Complaint: Sinus Problem, Low-back Pain, and Otalgia (Has been sick for couple weeks with cough)    Presents to clinic with ongoing and worsening cough, ear pain, and wheezing    Sinus Problem  Associated symptoms include chills, congestion, coughing, ear pain, sinus pressure and a sore throat. Pertinent negatives include no headaches, shortness of breath or sneezing.   Low-back Pain  Associated symptoms include chills, congestion, coughing and a sore throat. Pertinent negatives include no abdominal pain, fatigue, fever, headaches, myalgias, nausea, vomiting or weakness.   Otalgia   Associated symptoms include coughing and a sore throat. Pertinent negatives include no abdominal pain, ear discharge, headaches, rhinorrhea or vomiting.     Review of Systems   Constitutional:  Positive for chills. Negative for fatigue and fever.   HENT:  Positive for nasal congestion, ear pain, sinus pressure/congestion and sore throat. Negative for ear discharge, facial swelling, rhinorrhea and sneezing.    Eyes:  Negative for visual disturbance.   Respiratory:  Positive for cough and wheezing. Negative for chest tightness and shortness of breath.    Gastrointestinal:  Negative for abdominal pain, nausea and vomiting.   Genitourinary:  Negative for decreased urine volume.   Musculoskeletal:  Negative for myalgias.   Neurological:  Negative for weakness and headaches.   Hematological:  Negative for adenopathy.          Objective     Physical Exam  Vitals and nursing note reviewed.   Constitutional:       Appearance: Normal appearance. She is ill-appearing.   HENT:      Right Ear: Tenderness present. A middle ear effusion is present. Tympanic membrane is erythematous.      Left Ear: Tenderness present. A middle ear effusion is present. Tympanic membrane is injected.      Nose: Congestion present. No rhinorrhea.      Mouth/Throat:      Mouth: Mucous membranes are  moist.      Pharynx: No oropharyngeal exudate or posterior oropharyngeal erythema.   Cardiovascular:      Rate and Rhythm: Normal rate and regular rhythm.      Pulses: Normal pulses.      Heart sounds: Normal heart sounds.   Pulmonary:      Effort: Pulmonary effort is normal.      Breath sounds: Wheezing and rhonchi present.   Musculoskeletal:         General: Normal range of motion.      Cervical back: Normal range of motion and neck supple.   Skin:     General: Skin is warm and dry.      Capillary Refill: Capillary refill takes less than 2 seconds.   Neurological:      General: No focal deficit present.      Mental Status: She is alert and oriented to person, place, and time.            Assessment and Plan     1. Encounter for screening for malignant neoplasm of breast, unspecified screening modality  -     Mammo Digital Screening Bilat w/ Hasmukh (XPD); Future; Expected date: 05/01/2025    2. Recurrent acute serous otitis media of both ears    3. Lower respiratory infection  -     cefTRIAXone (Rocephin) 1 g in LIDOcaine HCL 10 mg/ml (1%) 4 mL IM only syringe  -     dexAMETHasone injection 4 mg  -     methylPREDNISolone acetate injection 40 mg  -     albuterol-ipratropium 2.5 mg-0.5 mg/3 mL nebulizer solution 3 mL  -     albuterol (VENTOLIN HFA) 90 mcg/actuation inhaler; Inhale 2 puffs into the lungs every 6 (six) hours as needed for Wheezing. Rescue  Dispense: 18 g; Refill: 0  -     amoxicillin-clavulanate 875-125mg (AUGMENTIN) 875-125 mg per tablet; Take 1 tablet by mouth every 12 (twelve) hours.  Dispense: 20 tablet; Refill: 0  -     predniSONE (DELTASONE) 20 MG tablet; Take 1 tablet (20 mg total) by mouth once daily.  Dispense: 7 tablet; Refill: 0  -     guaiFENesin (MUCINEX) 600 mg 12 hr tablet; Take 2 tablets (1,200 mg total) by mouth 2 (two) times daily.  Dispense: 40 tablet; Refill: 0  -     benzonatate (TESSALON) 200 MG capsule; Take 1 capsule (200 mg total) by mouth 3 (three) times daily as needed for  Cough.  Dispense: 30 capsule; Refill: 0    4. Bronchospasm  -     albuterol-ipratropium 2.5 mg-0.5 mg/3 mL nebulizer solution 3 mL  -     albuterol (VENTOLIN HFA) 90 mcg/actuation inhaler; Inhale 2 puffs into the lungs every 6 (six) hours as needed for Wheezing. Rescue  Dispense: 18 g; Refill: 0        Meds as prescribed  OTC meds as indicated and per instructions  Use saline nose sprays  Cool mist humidifier with distilled water  Warm salt water gargles  Warm tea with honey and lemon  Chloraseptic spray OTC.   Tylenol/ibuprofen for pain/fever greater than 100.4  Increase water intake.            Follow up if symptoms worsen or fail to improve.    I spent a total of 20 minutes on the day of the visit.This includes face to face time and non-face to face time preparing to see the patient (eg, review of tests), obtaining and/or reviewing separately obtained history, documenting clinical information in the electronic or other health record, independently interpreting results and communicating results to the patient/family/caregiver, or care coordinator.    ROSETTA Russ

## 2025-07-26 ENCOUNTER — HOSPITAL ENCOUNTER (EMERGENCY)
Facility: HOSPITAL | Age: 45
Discharge: HOME | End: 2025-07-26
Payer: MEDICAID

## 2025-07-26 VITALS
OXYGEN SATURATION: 98 % | DIASTOLIC BLOOD PRESSURE: 106 MMHG | BODY MASS INDEX: 33.75 KG/M2 | TEMPERATURE: 98 F | SYSTOLIC BLOOD PRESSURE: 150 MMHG | RESPIRATION RATE: 16 BRPM | WEIGHT: 210 LBS | HEIGHT: 66 IN | HEART RATE: 72 BPM

## 2025-07-26 DIAGNOSIS — G43.909 MIGRAINE WITHOUT STATUS MIGRAINOSUS, NOT INTRACTABLE, UNSPECIFIED MIGRAINE TYPE: Primary | ICD-10-CM

## 2025-07-26 PROCEDURE — 99285 EMERGENCY DEPT VISIT HI MDM: CPT | Mod: 25

## 2025-07-26 PROCEDURE — 96372 THER/PROPH/DIAG INJ SC/IM: CPT | Performed by: NURSE PRACTITIONER

## 2025-07-26 PROCEDURE — 63600175 PHARM REV CODE 636 W HCPCS: Mod: UD | Performed by: NURSE PRACTITIONER

## 2025-07-26 PROCEDURE — 99284 EMERGENCY DEPT VISIT MOD MDM: CPT | Mod: GF,,,

## 2025-07-26 RX ORDER — PROCHLORPERAZINE EDISYLATE 5 MG/ML
10 INJECTION INTRAMUSCULAR; INTRAVENOUS
Status: COMPLETED | OUTPATIENT
Start: 2025-07-26 | End: 2025-07-26

## 2025-07-26 RX ORDER — PROCHLORPERAZINE EDISYLATE 5 MG/ML
10 INJECTION INTRAMUSCULAR; INTRAVENOUS
Status: DISCONTINUED | OUTPATIENT
Start: 2025-07-26 | End: 2025-07-26

## 2025-07-26 RX ORDER — BUTALBITAL, ACETAMINOPHEN AND CAFFEINE 50; 325; 40 MG/1; MG/1; MG/1
1 TABLET ORAL EVERY 4 HOURS PRN
Qty: 10 TABLET | Refills: 0 | Status: SHIPPED | OUTPATIENT
Start: 2025-07-26 | End: 2025-07-29

## 2025-07-26 RX ORDER — KETOROLAC TROMETHAMINE 30 MG/ML
30 INJECTION, SOLUTION INTRAMUSCULAR; INTRAVENOUS
Status: COMPLETED | OUTPATIENT
Start: 2025-07-26 | End: 2025-07-26

## 2025-07-26 RX ADMIN — PROCHLORPERAZINE EDISYLATE 10 MG: 5 INJECTION INTRAMUSCULAR; INTRAVENOUS at 05:07

## 2025-07-26 RX ADMIN — KETOROLAC TROMETHAMINE 30 MG: 30 INJECTION, SOLUTION INTRAMUSCULAR at 05:07

## 2025-07-26 NOTE — DISCHARGE INSTRUCTIONS
Rest at home today. Drink plenty of liquids. Take Fioricet as prescribed as needed for headache pain. It will cause drowsiness, so do not drive or perform any activity that would put you or others at risk while taking this medication. May also take Tylenol or ibuprofen if needed. Follow-up with your PCP this week to discuss options for treatment of your headaches. Return to the ED for new or worsening symptoms.

## 2025-07-26 NOTE — ED PROVIDER NOTES
Encounter Date: 7/26/2025       History     Chief Complaint   Patient presents with    Headache     Headache to the back of her head and pain in her neck.  Symptoms started a week ago.  Hx of migraines but this HA different.  Went chiropractor yest.     Presented with c/o headache x 1 week accompanied by nausea today. Describes pain as constant throbbing that begins at base of skull and left neck that radiates up head primarily on left side. Denies vomiting or visual disturbance. Reports has PMH of migraines 1-2 times per month. Reports pain is not typical for her headaches. Reports has taken Tylenol and excedrin migraine with no improvement. States is not on a preventative at this point because her headaches are just getting started again after having no episodes during her last pregnancy-child is 18 mo old now.        Review of patient's allergies indicates:   Allergen Reactions    Biaxin [clarithromycin] Palpitations and Rash     Other reaction(s): Hives / Skin Rash     Past Medical History:   Diagnosis Date    Abnormal Pap smear of cervix 2003    Migraine headache      Past Surgical History:   Procedure Laterality Date    CERVICAL BIOPSY  W/ LOOP ELECTRODE EXCISION      CHOLECYSTECTOMY      FALLOPIAN TUBOPLASTY Bilateral     TUBAL LIGATION  2003    tubal ligation was reversed     Family History   Problem Relation Name Age of Onset    Arthritis Mother       Social History[1]  Review of Systems   Constitutional:  Negative for activity change and fever.   HENT:  Negative for congestion, sinus pain and sore throat.    Eyes:  Negative for photophobia and visual disturbance.   Respiratory:  Negative for cough, shortness of breath and wheezing.    Cardiovascular:  Negative for chest pain.   Gastrointestinal:  Positive for nausea. Negative for abdominal pain, diarrhea and vomiting.   Genitourinary: Negative.    Musculoskeletal: Negative.    Neurological:  Negative for dizziness and headaches.   Psychiatric/Behavioral:  Negative.         Physical Exam     Initial Vitals [07/26/25 0522]   BP Pulse Resp Temp SpO2   (!) 175/105 75 18 97.5 °F (36.4 °C) 98 %      MAP       --         Physical Exam    Nursing note and vitals reviewed.  Constitutional: She appears well-developed and well-nourished. No distress.   HENT:   Head: Normocephalic.       Right Ear: External ear normal.   Left Ear: External ear normal.   Nose: Nose normal. Mouth/Throat: Oropharynx is clear and moist.   Eyes: Conjunctivae and EOM are normal.   Neck: Neck supple.   Normal range of motion.  Cardiovascular:  Normal rate, regular rhythm, normal heart sounds and intact distal pulses.           No murmur heard.  Pulmonary/Chest: Breath sounds normal. She has no wheezes. She has no rhonchi. She has no rales.   Abdominal: Abdomen is soft. Bowel sounds are normal. There is no abdominal tenderness.   Musculoskeletal:         General: Tenderness present. Normal range of motion.      Cervical back: Normal range of motion and neck supple.     Neurological: She is alert and oriented to person, place, and time. She has normal strength. GCS score is 15. GCS eye subscore is 4. GCS verbal subscore is 5. GCS motor subscore is 6.   Skin: Skin is warm and dry. Capillary refill takes less than 2 seconds.   Psychiatric: She has a normal mood and affect.         Medical Screening Exam   See Full Note    ED Course   Procedures  Labs Reviewed - No data to display       Imaging Results              CT Head Without Contrast (Final result)  Result time 07/26/25 07:35:49      Final result by Jermaine Russ MD (07/26/25 07:35:49)                   Impression:      No acute abnormality.      Electronically signed by: Jermaine Russ  Date:    07/26/2025  Time:    07:35               Narrative:    EXAMINATION:  CT HEAD WITHOUT CONTRAST    CLINICAL HISTORY:  headache, worst of her life;    TECHNIQUE:  Low dose axial CT images obtained throughout the head without intravenous contrast. Sagittal  and coronal reconstructions were performed.    COMPARISON:  CT head 03/05/2020    FINDINGS:  Intracranial compartment:    Ventricles and sulci are normal in size for age without evidence of hydrocephalus. No extra-axial blood or fluid collections.    No parenchymal mass, hemorrhage, edema or major vascular distribution infarct.  No mass effect or midline shift.    Skull/extracranial contents (limited evaluation): No fracture. Mastoid air cells and paranasal sinuses are essentially clear.                                       Medications   ketorolac injection 30 mg (30 mg Intramuscular Given 7/26/25 0556)   prochlorperazine injection Soln 10 mg (10 mg Intramuscular Given 7/26/25 0556)     Medical Decision Making  Presented with c/o headache x 1 week accompanied by nausea today. Describes pain as constant throbbing that begins at base of skull and left neck that radiates up head primarily on left side. Denies vomiting or visual disturbance. Reports has PMH of migraines 1-2 times per month. Reports pain is not typical for her headaches. Reports has taken Tylenol and excedrin migraine with no improvement. States is not on a preventative at this point because her headaches are just getting started again after having no episodes during her last pregnancy-child is 18 mo old now.    Amount and/or Complexity of Data Reviewed  Radiology: ordered.     Details: CT head shows no acute abnormality.    Risk  Prescription drug management.  Risk Details: 07:38 evaluated at this time.  Results were discussed in detail.  She reports that she is feeling much better.  Discussed the importance of follow up with the PCP for a recheck.  Rx already sent for Fioricet.  Take as prescribed.  Strict ED return precautions.  All questions answered.  She verbalizes understanding and is agreement with this plan.  Given the large differential diagnosis for Sabiha Dunbar, the decision making in this case is of high complexity.    After evaluating  all of the data points in this case, the presentation of Sabiha Dunbar is NOT consistent with fracture, meningitis/encephalitis, SAH/sentinel bleed, Intracranial Hemorrhage (ICH) (subdura/epidural), acute obstructive hydrocephalus, space occupying lesions, CVA, CO Poisoning, Basilar artery dissection, preeclampsia, cerebral venous thrombosis, hypertensive emergency, suicidal ideation, temporal Arteritis, Idiopathic Intracranial Hypertension (pseudotumor cerebri).    Strict return and follow-up precautions have been given by me personally.    Data Reviewed/Counseling: I have reviewed the patient's vital signs, nursing notes, and other relevant tests/information. I had a detailed discussion regarding the historical points, exam findings, and any diagnostic results supporting the discharge diagnosis. I also discussed the need for outpatient follow-up and the need to return to the ED if symptoms worsen or if there are any questions or concerns that arise at home.   Final diagnoses:  [G43.909] Migraine without status migrainosus, not intractable, unspecified migraine type (Primary)               ED Course as of 07/26/25 0742   Sat Jul 26, 2025   0640 PT reports headache has improved to 5/10 now. Denies nausea. Remains alert and oriented. [DS]      ED Course User Index  [DS] Mirella Melara NP                           Clinical Impression:   Final diagnoses:  [G43.909] Migraine without status migrainosus, not intractable, unspecified migraine type (Primary)                 [1]   Social History  Tobacco Use    Smoking status: Never     Passive exposure: Never    Smokeless tobacco: Never   Vaping Use    Vaping status: Never Used   Substance Use Topics    Alcohol use: Not Currently     Alcohol/week: 2.0 standard drinks of alcohol     Types: 2 Cans of beer per week     Comment: occasional social drink    Drug use: Never        Kike Lawson, [facility]  07/26/25 0742

## 2025-07-28 ENCOUNTER — OFFICE VISIT (OUTPATIENT)
Dept: FAMILY MEDICINE | Facility: CLINIC | Age: 45
End: 2025-07-28
Payer: MEDICAID

## 2025-07-28 VITALS
HEIGHT: 66 IN | TEMPERATURE: 97 F | RESPIRATION RATE: 18 BRPM | SYSTOLIC BLOOD PRESSURE: 130 MMHG | WEIGHT: 207 LBS | OXYGEN SATURATION: 98 % | HEART RATE: 129 BPM | BODY MASS INDEX: 33.27 KG/M2 | DIASTOLIC BLOOD PRESSURE: 84 MMHG

## 2025-07-28 DIAGNOSIS — I10 PRIMARY HYPERTENSION: Primary | ICD-10-CM

## 2025-07-28 PROBLEM — H60.502 ACUTE OTITIS EXTERNA OF LEFT EAR: Status: RESOLVED | Noted: 2023-09-28 | Resolved: 2025-07-28

## 2025-07-28 PROBLEM — H66.93 OTITIS OF BOTH EARS: Status: RESOLVED | Noted: 2023-03-22 | Resolved: 2025-07-28

## 2025-07-28 PROBLEM — G89.29 OTHER CHRONIC PAIN: Status: RESOLVED | Noted: 2023-02-14 | Resolved: 2025-07-28

## 2025-07-28 PROBLEM — J06.9 VIRAL URI WITH COUGH: Status: RESOLVED | Noted: 2024-12-02 | Resolved: 2025-07-28

## 2025-07-28 PROBLEM — J02.9 PHARYNGITIS: Status: RESOLVED | Noted: 2023-03-22 | Resolved: 2025-07-28

## 2025-07-28 PROBLEM — N30.90 CYSTITIS: Status: RESOLVED | Noted: 2024-06-17 | Resolved: 2025-07-28

## 2025-07-28 PROBLEM — M89.8X1 SHOULDER BLADE PAIN: Status: RESOLVED | Noted: 2022-11-30 | Resolved: 2025-07-28

## 2025-07-28 PROBLEM — M62.838 MUSCLE SPASM: Status: RESOLVED | Noted: 2023-02-14 | Resolved: 2025-07-28

## 2025-07-28 PROBLEM — H66.92 LEFT OTITIS MEDIA: Status: RESOLVED | Noted: 2024-12-02 | Resolved: 2025-07-28

## 2025-07-28 PROCEDURE — 3075F SYST BP GE 130 - 139MM HG: CPT | Mod: CPTII,,,

## 2025-07-28 PROCEDURE — 3008F BODY MASS INDEX DOCD: CPT | Mod: CPTII,,,

## 2025-07-28 PROCEDURE — 1159F MED LIST DOCD IN RCRD: CPT | Mod: CPTII,,,

## 2025-07-28 PROCEDURE — 4010F ACE/ARB THERAPY RXD/TAKEN: CPT | Mod: CPTII,,,

## 2025-07-28 PROCEDURE — 99214 OFFICE O/P EST MOD 30 MIN: CPT | Mod: ,,,

## 2025-07-28 PROCEDURE — 3079F DIAST BP 80-89 MM HG: CPT | Mod: CPTII,,,

## 2025-07-28 RX ORDER — LOSARTAN POTASSIUM 25 MG/1
25 TABLET ORAL DAILY
Qty: 90 TABLET | Refills: 3 | Status: SHIPPED | OUTPATIENT
Start: 2025-07-28 | End: 2026-07-28

## 2025-07-28 NOTE — PATIENT INSTRUCTIONS
"Controlling Your Blood Pressure Through Lifestyle   The Basics   Written by the doctors and editors at [facility]   What does my lifestyle have to do with my blood pressure? -- The things you do and the foods you eat have a big effect on your blood pressure and your overall health. Following the right lifestyle can:  Lower your blood pressure or keep you from getting high blood pressure in the first place  Reduce your need for blood pressure medicines  Make medicines for high blood pressure work better, if you do take them  Lower the chances that you'll have a heart attack or stroke, or develop kidney disease  Which lifestyle choices will help lower my blood pressure? -- Here's what you can do:  Lose weight (if you are overweight)  Choose a diet rich in fruits, vegetables, and low-fat dairy products, and low in meats, sweets, and refined grains  Eat less salt (sodium)  Do something active for at least 30 minutes a day on most days of the week  Limit the amount of alcohol you drink  If you have high blood pressure, it's also very important to quit smoking (if you smoke). Quitting smoking might not bring your blood pressure down. But it will lower the chances that you'll have a heart attack or stroke, and it will help you feel better and live longer.  Start low and go slow -- The changes listed above might sound like a lot, but don't worry. You don't have to change everything all at once. The key to improving your lifestyle is to "start low and go slow." Choose 1 small, specific thing to change and try doing it for a while. If it works for you, keep doing it until it becomes a habit. If it doesn't, don't give up. Choose something else to change and see how that goes.  Let's say, for example, that you would like to improve your diet. If you're the type of person who eats cheeseburgers and French fries all the time, you can't switch to eating just salads from one day to the next. When people try to make changes like that, " "they often fail. Then they feel frustrated and tend to give up. So instead of trying to change everything about your diet in 1 day, change 1 or 2 small things about your diet and give yourself time to get used to those changes. For instance, keep the cheeseburger but give up the French fries. Or eat the same things but cut your portions in half.  As you find things that you are able to change and stick with, keep adding new changes. In time, you will see that you can actually change a lot. You just have to get used to the changes slowly.  Lose weight -- When people think about losing weight, they sometimes make it more complicated than it really is. To lose weight, you have to either eat less or move more. If you do both of those things, it's even better. But there is no single weight-loss diet or activity that's better than any other. When it comes to weight loss, the most effective plan is the one that you'll stick with.  Improve your diet -- There is no single diet that is right for everyone. But in general, a healthy diet can include:  Lots of fruits, vegetables, and whole grains  Some beans, peas, lentils, chickpeas, and similar foods  Some nuts, such as walnuts, almonds, and peanuts  Fat-free or low-fat milk and milk products  Some fish  To have a healthy diet, it's also important to limit or avoid sugar, sweets, meats, and refined grains. (Refined grains are found in white bread, white rice, most forms of pasta, and most packaged "snack" foods.)  Reduce salt -- Many people think that eating a low-sodium diet means avoiding the salt shaker and not adding salt when cooking. The truth is, not adding salt at the table or when you cook will only help a little. Almost all of the sodium you eat is already in the food you buy at the grocery store or at restaurants (figure 1).  The most important thing you can do to cut down on sodium is to eat less processed food. That means that you should avoid most foods that are " "sold in cans, boxes, jars, and bags. You should also eat in restaurants less often.  To reduce the amount of sodium you get, buy fresh or fresh-frozen fruits, vegetables, and meats. (Fresh-frozen foods have had nothing added to them before freezing.) Then you can make meals at home, from scratch, with these ingredients.  As with the other changes, don't try to cut out salt all at once. Instead, choose 1 or 2 foods that have a lot of sodium and try to replace them with low-sodium choices. When you get used to those low-sodium options, find another food or 2 to change. Then keep going, until all the foods you eat are sodium-free or low in sodium.  Become more active -- If you want to be more active, you don't have to go to the gym or get all sweaty. It is possible to increase your activity level while doing everyday things you enjoy. Walking, gardening, and dancing are just a few of the things that you might try. As with all the other changes, the key is not to do too much too fast. If you don't do any activity now, start by walking for just a few minutes every other day. Do that for a few weeks. If you stick with it, try doing it for longer. But if you find that you don't like walking, try a different activity.  Drink less alcohol -- If you are a woman, do not have more than 1 "standard drink" of alcohol a day. If you are a man, do not have more than 2. A "standard drink" is:  A can or bottle that has 12 ounces of beer  A glass that has 5 ounces of wine  A shot that has 1.5 ounces of whiskey  Where should I start? -- If you want to improve your lifestyle, start by making the changes that you think would be easiest for you. If you used to exercise and just got out of the habit, maybe it would be easy for you to start exercising again. Or if you actually like cooking meals from scratch, maybe the first thing you should focus on is eating home-cooked meals that are low in sodium.  Whatever you tackle first, choose " "specific, realistic goals, and give yourself a deadline. For example, do not decide that you are going to "exercise more." Instead, decide that you are going to walk for 10 minutes on Monday, Wednesday, and Friday, and that you are going to do this for the next 2 weeks.  When lifestyle changes are too general, people have a hard time following through.  Now go. You can do it!  All topics are updated as new evidence becomes available and our peer review process is complete.  This topic retrieved from [employer] on: Sep 21, 2021.  Topic 92601 Version 8.0  Release: 29.4.2 - C29.263  © 2021 UpToDate, Inc. and/or its affiliates. All rights reserved.  figure 1: Sources of sodium in your diet     Graphic 63884 Version 2.0    Consumer Information Use and Disclaimer   This information is not specific medical advice and does not replace information you receive from your health care provider. This is only a brief summary of general information. It does NOT include all information about conditions, illnesses, injuries, tests, procedures, treatments, therapies, discharge instructions or life-style choices that may apply to you. You must talk with your health care provider for complete information about your health and treatment options. This information should not be used to decide whether or not to accept your health care provider's advice, instructions or recommendations. Only your health care provider has the knowledge and training to provide advice that is right for you. The use of this information is governed by the [employer] End User License Agreement, available at https://www."[employer].MyTwinPlace/en/solutions/Moneythink/about/ruth.The use of [employer] content is governed by the [employer] Terms of Use. ©2021 UpToDate[employer] Inc. All rights reserved.  Copyright   © 2021 Up[employer]Date, Inc. and/or its affiliates. All rights reserved.      DASH Diet   About this topic   DASH stands for Dietary Approaches to Stop Hypertension. The DASH diet may " help you lower blood pressure. It may also help keep you from getting high blood pressure. You will eat less fat and more fiber on the DASH diet.  This diet gives you more minerals that fight high blood pressure. Some nutrients in this diet are:  Potassium ? Acts to help you get rid of salt. This may help to lower blood pressure.  Calcium ? Makes blood vessels and muscles work the right way  Magnesium - Helps blood vessels relax  Fiber ? Helps you feel full. It also helps digestion.  What will the results be?   The DASH diet may help you:  Lower your blood pressure and cholesterol  Lower your risk for cancer, heart disease, heart attack, and stroke. It may also lower your risk for heart failure, kidney stones, and diabetes.  Lose weight or keep a healthy weight  What lifestyle changes are needed?   Add regular exercise to get the most help from this diet.  Try to lower stress. Find ways to relax.  Stop smoking. Avoid secondhand smoke.  Limit alcohol intake.  What changes to diet are needed?   Know about poor eating habits. Then, you can fix them as you work with the program.  This diet encourages fruits and vegetables, whole grains, lean meats, healthy fats, and low-fat or fat-free dairy products.  This diet is lower in saturated fats, trans-fats, cholesterol, added sugars, and sodium.  Who should use this diet?   This eating plan is good for the whole family. It is also good for people with high blood pressure and those at risk for high blood pressure.  What foods are good to eat?   Grains: Try to eat 6 to 8 servings of whole grain, high fiber foods each day. These are bread, cereals, brown rice, or pasta.  Fruits and vegetables: Eat 4 to 5 servings each day. Try to pick many kinds and colors. Fresh or frozen are best. Look for low sodium or salt-free if you choose canned.  Dairy: Try to eat 2 to 3 servings of fat free and low fat milk products each day.  Lean meats, poultry, and seafood: Try to eat 6 servings or  less of lean meats, poultry, and seafood each day. Try to choose more low fat or lean meats like chicken and turkey. Eat less red meat. Eat more fish instead.  Nuts, seeds, and legumes (dry beans and peas): Try to eat 4 to 5 servings each week. Try to pick nuts such as almonds and walnuts, sunflower seeds, peanut butter, soy beans, lentils, kidney beans, and split peas.  Fats and oils: Try to eat 2 to 3 servings of fats and oils each day. Eat good fats found in fish, nuts, and avocados. Try using olive oil or vegetable oils such as canola oil. Other good oils to try are corn, safflower, sunflower, or soybean oils. Use low-sodium and low-fat salad dressing and mayonnaise.  Condiments: Pepper, herbs, spices, vinegar, lemon or lime juices are great for seasoning. Be careful to choose low-sodium or salt-free products if you use broths, soups, or soy sauce.  Sweets: Try to eat less than 5 servings each week. Choose low-fat and trans fat-free desserts. These are things like fruit flavored gelatin, sorbet, jelly beans, freedom crackers, animal crackers, low-fat fig bars, and lizbeth snaps. Eat fruit to satisfy your desire for sweets.     What foods should be limited or avoided?   Grains: Salted breads, rolls, crackers, quick breads, self-rising flours, biscuit mixes, regular bread crumbs, instant hot cereals, commercially-prepared rice, pasta, stuffing mixes  Fruits and vegetables: Commercially-prepared potatoes and vegetable mixes, regular canned vegetables and juices, vegetables frozen with sauce or pickled vegetables, processed fruits with salt or sodium  Milk: Whole milk, malted milk, chocolate milk, buttermilk, cheese, ice cream  Meats and beans: Smoked, cured, salted, or canned fish; meats or poultry such as mackey, sausages, sardines; high-fat cuts of meat like beef, lamb, or pork; chicken with the skin on it  Fats: Cut back on solid fats like butter, lard, and margarine. Eat less food with high saturated fat,  cholesterol and total fat.  Condiments and snacks: Salted and canned peas, beans, and olives; salted snack foods; fried foods; soda or other sweetened drinks  Sweets: High-fat baked goods such as muffins, donuts, pastries, commercial baked goods, candy bars  If you choose to drink alcohol, limit the amount you drink. Women should have 1 drink or less per day and men should have 2 drinks or less per day.  Helpful tips   Avoid eating canned vegetables and processed foods. These have a lot of salt in them. Look for a low-salt or low-sodium choice.  Try baking or broiling instead of frying food.  Write down the foods you eat. This will help you track what you have eaten each week.  When you go to a grocery store, have a list or a meal plan. Do not shop when you are hungry to avoid cravings for foods.  Read food labels with care. They will show you how much is in a serving. The amount is given as a percentage of the total amount you need each day. Reading labels will help you make healthy food choices.       Avoid fast foods.  Talk to your doctor or dietitian to see if you need vitamin and mineral supplements to help you balance your diet.  Talk to a dietitian for help.  Where can I learn more?   Academy of Nutrition and Dietetics  https://www.eatright.org/health/wellness/heart-and-cardiovascular-health/dash-diet-reducing-hypertension-through-diet-and-lifestyle   FamilyDoctor.org  http://familydoctor.org/familydoctor/en/prevention-wellness/food-nutrition/weight-loss/the-dash-diet-healthy-eating-to-control-your-blood-pressure.html   Last Reviewed Date   2021-03-15  Consumer Information Use and Disclaimer   This information is not specific medical advice and does not replace information you receive from your health care provider. This is only a brief summary of general information. It does NOT include all information about conditions, illnesses, injuries, tests, procedures, treatments, therapies, discharge instructions or  life-style choices that may apply to you. You must talk with your health care provider for complete information about your health and treatment options. This information should not be used to decide whether or not to accept your health care providers advice, instructions or recommendations. Only your health care provider has the knowledge and training to provide advice that is right for you.  Copyright   Copyright © 2021 Up[employer]Date, Inc. and its affiliates and/or licensors. All rights reserved.    Why Water Is Important to Health   About this topic   Your body needs a certain amount of water to work the right way. Your body takes in water from the fluids you drink and the food you eat. This helps your body get rid of waste products. Water also helps keep your temperature normal, helps with digestion, protects your spinal cord, and lubricates your joints.  If you dont have enough water, doctors say you are dehydrated. You must take in enough water each day to replace what your body loses when you:  Sweat  Pass urine  Have a bowel movement  Breathe  You may lose even more water than normal if you are sick or hurt with something like:  A fever  A burn  An illness where you throw up or have loose stools  Some medicines can also make you lose more water than normal.  General   Most people have heard they need 6 to 8 glasses of water each day. This suggestion is not backed by science. Different people need to drink different amounts of water. How much you need to drink is based on things like your age, body, health, weather, and how active you are. It is even possible to drink too much water in some cases. Being thirsty is your bodys way of telling you it needs fluids.  Good ways to make sure you take in enough fluids include:  Drink healthy fluids when you are thirsty. These are things like water, low-fat milk, plain or flavored seltzer, or unsweetened tea or coffee.  Eat foods that have a higher water content. Fruits  and vegetables like strawberries, watermelon, tomatoes, and celery all have high water content.  Drink water before, during, and after a workout. Only drink sports drinks if you plan to exercise at an intense rate for more than an hour. Sports drinks have carbohydrates and electrolytes that can help with recovery.  Drink water with meals.  There are some kinds of drinks that are not healthy and should be limited or avoided. Try NOT to drink:  Sugary drinks like soda, sports drinks, or sweetened tea or coffee. These can add calories to your diet and lead to tooth decay.  Energy drinks. These can have a lot of caffeine and sugar in them.  Juice drinks. These often have limited amounts of juice and a lot of sugar in them.  Too much alcohol or caffeine. Both of these can cause dehydration.  Tips to increase your fluid intake:  Keep a bottle of water with you. This can encourage you to drink more.  Add fruits or vegetables to plain water to change the taste. Add berries, aldo, or cucumbers to your water to flavor it.  When you are hungry, you might actually be thirsty. True hunger will not go away by drinking water. Water can help you manage your weight.  If you have trouble remembering to drink water, try drinking water on a schedule. Set specific times for when you will drink water.  Choose water when eating at restaurants.  Where can I learn more?   American Association of Family Physicians  https://familydoctor.org/hydration-why-its-so-important/   Better Health Channel  https://www.betterhealth.efra.gov.au/health/HealthyLiving/water-a-vital-nutrient   Centers for Disease Control and Prevention  https://www.cdc.gov/healthywater/drinking/nutrition/index.html   Kids Health  https://kidshealth.org/en/kids/water.html   Last Reviewed Date   2021-09-09  Consumer Information Use and Disclaimer   This information is not specific medical advice and does not replace information you receive from your health care provider. This  is only a brief summary of general information. It does NOT include all information about conditions, illnesses, injuries, tests, procedures, treatments, therapies, discharge instructions or life-style choices that may apply to you. You must talk with your health care provider for complete information about your health and treatment options. This information should not be used to decide whether or not to accept your health care providers advice, instructions or recommendations. Only your health care provider has the knowledge and training to provide advice that is right for you.  Copyright   Copyright © 2021 UpToDate, Inc. and its affiliates and/or licensors. All rights reserved.

## 2025-07-29 NOTE — PROGRESS NOTES
Subjective     Patient ID: Sabiha Dunbar is a 44 y.o. female.    Chief Complaint: Follow-up (ER followup increased blood pressure)    History of Present Illness    CHIEF COMPLAINT:  Patient presents today for headache and high blood pressure.    HISTORY OF PRESENT ILLNESS:  She reports severe headache lasting almost a week, located in the back of her neck, unresponsive to OTC medications including Tylenol and Excedrin Migraine. She visited a chiropractor for symptom management without relief. She recently visited the emergency room due to the persistent headache, where high blood pressure was noted, though the causal relationship between headache and blood pressure was unclear.    HYPERTENSION:  She has a history of typically running blood pressure around 120/70 with previous history of low blood pressure, particularly during periods of frequent migraines. She was on blood pressure medication approximately 15 years ago but was not continued on treatment due to concerns about further lowering her already low blood pressure. She acknowledges recent weight gain and recognizes potential impact on blood pressure.    WEIGHT MANAGEMENT:  She reports significant weight gain since pregnancy and acknowledges consuming excessive junk food. She is motivated to improve dietary habits due to family concerns about her health and is attempting to modify her diet by reducing salt intake, increasing water consumption, and eliminating sodas and caffeine.    MEDICATIONS:  She has a history of previous Nurtec prescription from Dr. Sims for headaches, which was not covered by Medicaid. She has been taking substantial amounts of OTC pain medication for her current headache without relief.            Review of Systems   Constitutional:  Negative for fatigue and unexpected weight change.   Eyes:  Negative for visual disturbance.   Respiratory:  Negative for cough, chest tightness, shortness of breath and wheezing.    Cardiovascular:   "Negative for chest pain, palpitations and leg swelling.   Gastrointestinal:  Negative for abdominal pain, blood in stool, constipation, diarrhea, nausea and vomiting.   Genitourinary:  Negative for dysuria.   Skin:  Negative for color change and rash.   Neurological:  Positive for headaches. Negative for dizziness, weakness and light-headedness.   Hematological:  Does not bruise/bleed easily.       Vital Signs  Temp: 97.1 °F (36.2 °C)  Temp Source: Temporal  Pulse: (!) 129  Resp: 18  SpO2: 98 %  BP: 130/84  BP Location: Left arm  Patient Position: Sitting  Height and Weight  Height: 5' 6" (167.6 cm)  Weight: 93.9 kg (207 lb)  BSA (Calculated - sq m): 2.09 sq meters  BMI (Calculated): 33.4  Weight in (lb) to have BMI = 25: 154.6]    Physical Exam  Vitals and nursing note reviewed.   Constitutional:       General: She is not in acute distress.     Appearance: Normal appearance. She is obese. She is not ill-appearing, toxic-appearing or diaphoretic.   Cardiovascular:      Rate and Rhythm: Normal rate and regular rhythm.      Pulses: Normal pulses.      Heart sounds: Normal heart sounds.   Pulmonary:      Effort: Pulmonary effort is normal. No respiratory distress.      Breath sounds: Normal breath sounds.   Musculoskeletal:         General: Normal range of motion.   Skin:     General: Skin is warm and dry.      Capillary Refill: Capillary refill takes less than 2 seconds.   Neurological:      General: No focal deficit present.      Mental Status: She is alert and oriented to person, place, and time.   Psychiatric:         Mood and Affect: Mood normal.         Behavior: Behavior normal.          Assessment and Plan     1. Primary hypertension  -     losartan (COZAAR) 25 MG tablet; Take 1 tablet (25 mg total) by mouth once daily.  Dispense: 90 tablet; Refill: 3      Assessment & Plan    R51.0 Headache with orthostatic component, not elsewhere classified  I10 Essential (primary) hypertension  G43.909 Migraine, " unspecified, not intractable, without status migrainosus  R63.5 Abnormal weight gain    HEADACHE AND MIGRAINE:  - Assessed the patient's recent ER visit for persistent severe headache lasting almost a week that did not improve with interventions.  - Evaluated possible relationship between headache and elevated blood pressure noted during ER visit.  - Patient has history of daily migraines, previously managed with Atenolol which was discontinued due to hypotension concerns.  - Currently using acetaminophen and Excedrin migraine for relief, but symptoms have worsened recently.  - Discussed headache management options and recommended chiropractic treatment for associated cervical pain.  - Provided samples of Nurtec (rimegepant) for acute migraine treatment.    ESSENTIAL HYPERTENSION:  - Blood pressure was elevated during recent ER visit and previous appointments, though patient reports it usually runs around 120/70.  - Recent elevation possibly due to weight gain and dietary factors.  - Prescribed Losartan 25mg daily for hypertension management.  - Counseled on dietary modifications including reducing sodium intake, eliminating sodas and caffeine, and increasing water consumption.    ABNORMAL WEIGHT GAIN:  - Patient reports significant weight gain since pregnancy, associated with poor dietary habits.  - Patient acknowledges weight gain and its potential impact on health, motivated by children's concerns.  - Counseled on dietary modifications to manage weight, including reducing sodium and caffeine intake.    FOLLOW-UP:  - Scheduled follow-up visit in 1 week to reassess blood pressure.                  Follow up in about 1 week (around 8/4/2025) for BP/Headache.    This note was generated with the assistance of ambient listening technology. Verbal consent was obtained by the patient and accompanying visitor(s) for the recording of patient appointment to facilitate this note. I attest to having reviewed and edited the  generated note for accuracy, though some syntax or spelling errors may persist. Please contact the author of this note for any clarification.         I spent a total of 15 minutes on the day of the visit.This includes face to face time and non-face to face time preparing to see the patient (eg, review of tests), obtaining and/or reviewing separately obtained history, documenting clinical information in the electronic or other health record, independently interpreting results and communicating results to the patient/family/caregiver, or care coordinator.    ROSETTA Russ

## 2025-08-04 ENCOUNTER — OFFICE VISIT (OUTPATIENT)
Dept: FAMILY MEDICINE | Facility: CLINIC | Age: 45
End: 2025-08-04
Payer: MEDICAID

## 2025-08-04 VITALS
DIASTOLIC BLOOD PRESSURE: 86 MMHG | SYSTOLIC BLOOD PRESSURE: 139 MMHG | OXYGEN SATURATION: 98 % | HEART RATE: 83 BPM | BODY MASS INDEX: 33.57 KG/M2 | TEMPERATURE: 98 F | HEIGHT: 66 IN | WEIGHT: 208.88 LBS

## 2025-08-04 DIAGNOSIS — I10 PRIMARY HYPERTENSION: Primary | ICD-10-CM

## 2025-08-04 PROBLEM — R51.9 WORSENING HEADACHES: Status: RESOLVED | Noted: 2023-07-05 | Resolved: 2025-08-04

## 2025-08-04 PROBLEM — S46.811A STRAIN OF RIGHT SUBSCAPULARIS MUSCLE: Status: RESOLVED | Noted: 2022-11-30 | Resolved: 2025-08-04

## 2025-08-04 PROCEDURE — 3008F BODY MASS INDEX DOCD: CPT | Mod: CPTII,,,

## 2025-08-04 PROCEDURE — 3075F SYST BP GE 130 - 139MM HG: CPT | Mod: CPTII,,,

## 2025-08-04 PROCEDURE — 1159F MED LIST DOCD IN RCRD: CPT | Mod: CPTII,,,

## 2025-08-04 PROCEDURE — 4010F ACE/ARB THERAPY RXD/TAKEN: CPT | Mod: CPTII,,,

## 2025-08-04 PROCEDURE — 1160F RVW MEDS BY RX/DR IN RCRD: CPT | Mod: CPTII,,,

## 2025-08-04 PROCEDURE — 99214 OFFICE O/P EST MOD 30 MIN: CPT | Mod: ,,,

## 2025-08-04 PROCEDURE — 3079F DIAST BP 80-89 MM HG: CPT | Mod: CPTII,,,

## 2025-08-04 NOTE — PROGRESS NOTES
Subjective     Patient ID: Sabiha Dunbar is a 44 y.o. female.    Chief Complaint: Follow-up and Health Maintenance (Exam Room C Hepatitis C Screening Never done/TETANUS VACCINE Never done/Mammogram due on 04/12/2023/COVID-19 Vaccine(1 - 2024-25 season) Never done )    History of Present Illness    CHIEF COMPLAINT:  Patient presents today for follow up of blood pressure management.    HYPERTENSION:  She reports home blood pressure readings typically around 120/70s. She initially experienced medication side effects with blood pressure dropping to 103/60s, accompanied by significant fatigue. She is currently taking half pill of blood pressure medication. She notes blood pressure increases with physical activity, particularly when walking or exercising. She missed medication yesterday due to emotional distress from her grandmother's death. She acknowledges weight gain as a contributing factor to blood pressure issues.    HEADACHES:  Her headaches have improved since starting blood pressure medication. She continues to experience migraines with aura, which she treats with Excedrin migraine. She describes occasional migraine episodes characterized by visual aura without significant pain, which resolve with Excedrin migraine medication.    LIFESTYLE MODIFICATIONS:  She has eliminated sodas and caffeine from her diet and now drinks water throughout the day. She is actively attempting to exercise and lose weight, acknowledging weight gain as a significant health concern. She understands that her health issues emerged after weight gain and is motivated to make lifestyle modifications to improve her overall health status.            Review of Systems   Constitutional:  Negative for fatigue and unexpected weight change.   Respiratory:  Negative for cough, chest tightness, shortness of breath and wheezing.    Cardiovascular:  Negative for chest pain, palpitations and leg swelling.   Gastrointestinal:  Negative for abdominal  "pain, blood in stool, constipation, diarrhea, nausea and vomiting.   Genitourinary:  Negative for dysuria.   Skin:  Negative for color change and rash.   Neurological:  Positive for headaches. Negative for dizziness, weakness and light-headedness.   Hematological:  Does not bruise/bleed easily.         Vital Signs  Temp: 98.3 °F (36.8 °C)  Temp Source: Oral  Pulse: 83  SpO2: 98 %  BP: 139/86  BP Location: Right arm  Patient Position: Sitting  Height and Weight  Height: 5' 6" (167.6 cm)  Weight: 94.8 kg (208 lb 14.4 oz)  BSA (Calculated - sq m): 2.1 sq meters  BMI (Calculated): 33.7  Weight in (lb) to have BMI = 25: 154.6]    Physical Exam  Vitals and nursing note reviewed.   Constitutional:       Appearance: Normal appearance. She is obese.   Cardiovascular:      Rate and Rhythm: Normal rate and regular rhythm.      Pulses: Normal pulses.      Heart sounds: Normal heart sounds.   Pulmonary:      Effort: Pulmonary effort is normal.      Breath sounds: Normal breath sounds.   Musculoskeletal:         General: Normal range of motion.   Skin:     General: Skin is warm and dry.      Capillary Refill: Capillary refill takes less than 2 seconds.   Neurological:      General: No focal deficit present.      Mental Status: She is alert and oriented to person, place, and time.   Psychiatric:         Mood and Affect: Mood normal.         Behavior: Behavior normal.            Assessment and Plan     1. Primary hypertension      Assessment & Plan    I10 Essential (primary) hypertension  G43.109 Migraine with aura, not intractable, without status migrainosus  Z63.4 Disappearance and death of family member    ESSENTIAL HYPERTENSION:  - Assessed blood pressure management, noting home readings typically 120/70, but 103/60 when first taking medication (causing fatigue).  - Today's readings were in the 130s/80s or upper 120s/80s, with tendency to elevate during physical activity.  - Adjusted medication strategy to half a pill of " current medication and initiated longer-acting antihypertensive to be taken daily.  - Patient to continue monitoring blood pressure, especially during exercise.    MIGRAINE WITH AURA:  - Migraines with aura have improved since initiating antihypertensive medication.  - Prescribed new longer-acting migraine medication (4 doses provided) while continuing current short-acting Excedrin migraine for breakthrough symptoms.  - Patient instructed to contact office to report effectiveness of new medication.    EMOTIONAL DISTRESS RELATED TO FAMILY MEMBER'S DEATH:  - Monitored patient's emotional status, noting fatigue and emotional distress related to the recent death of a family member.    GENERAL HEALTH MAINTENANCE:  - Patient to continue with current exercise regimen and maintain dietary changes, including avoiding sodas and caffeine, increasing water intake, and continuing efforts to lose weight.         Headaches improved some with BP control but still having migraine with aura. Samples of ubrelvy and qulipta provided and asked that she notify us how they are working for her.          Follow up in about 3 months (around 11/4/2025).    This note was generated with the assistance of ambient listening technology. Verbal consent was obtained by the patient and accompanying visitor(s) for the recording of patient appointment to facilitate this note. I attest to having reviewed and edited the generated note for accuracy, though some syntax or spelling errors may persist. Please contact the author of this note for any clarification.         I spent a total of 15 minutes on the day of the visit.This includes face to face time and non-face to face time preparing to see the patient (eg, review of tests), obtaining and/or reviewing separately obtained history, documenting clinical information in the electronic or other health record, independently interpreting results and communicating results to the patient/family/caregiver, or  care coordinator.    ROSETTA Russ

## 2025-08-04 NOTE — PATIENT INSTRUCTIONS
"Controlling Your Blood Pressure Through Lifestyle   The Basics   Written by the doctors and editors at [facility]   What does my lifestyle have to do with my blood pressure? -- The things you do and the foods you eat have a big effect on your blood pressure and your overall health. Following the right lifestyle can:  Lower your blood pressure or keep you from getting high blood pressure in the first place  Reduce your need for blood pressure medicines  Make medicines for high blood pressure work better, if you do take them  Lower the chances that you'll have a heart attack or stroke, or develop kidney disease  Which lifestyle choices will help lower my blood pressure? -- Here's what you can do:  Lose weight (if you are overweight)  Choose a diet rich in fruits, vegetables, and low-fat dairy products, and low in meats, sweets, and refined grains  Eat less salt (sodium)  Do something active for at least 30 minutes a day on most days of the week  Limit the amount of alcohol you drink  If you have high blood pressure, it's also very important to quit smoking (if you smoke). Quitting smoking might not bring your blood pressure down. But it will lower the chances that you'll have a heart attack or stroke, and it will help you feel better and live longer.  Start low and go slow -- The changes listed above might sound like a lot, but don't worry. You don't have to change everything all at once. The key to improving your lifestyle is to "start low and go slow." Choose 1 small, specific thing to change and try doing it for a while. If it works for you, keep doing it until it becomes a habit. If it doesn't, don't give up. Choose something else to change and see how that goes.  Let's say, for example, that you would like to improve your diet. If you're the type of person who eats cheeseburgers and French fries all the time, you can't switch to eating just salads from one day to the next. When people try to make changes like that, " "they often fail. Then they feel frustrated and tend to give up. So instead of trying to change everything about your diet in 1 day, change 1 or 2 small things about your diet and give yourself time to get used to those changes. For instance, keep the cheeseburger but give up the French fries. Or eat the same things but cut your portions in half.  As you find things that you are able to change and stick with, keep adding new changes. In time, you will see that you can actually change a lot. You just have to get used to the changes slowly.  Lose weight -- When people think about losing weight, they sometimes make it more complicated than it really is. To lose weight, you have to either eat less or move more. If you do both of those things, it's even better. But there is no single weight-loss diet or activity that's better than any other. When it comes to weight loss, the most effective plan is the one that you'll stick with.  Improve your diet -- There is no single diet that is right for everyone. But in general, a healthy diet can include:  Lots of fruits, vegetables, and whole grains  Some beans, peas, lentils, chickpeas, and similar foods  Some nuts, such as walnuts, almonds, and peanuts  Fat-free or low-fat milk and milk products  Some fish  To have a healthy diet, it's also important to limit or avoid sugar, sweets, meats, and refined grains. (Refined grains are found in white bread, white rice, most forms of pasta, and most packaged "snack" foods.)  Reduce salt -- Many people think that eating a low-sodium diet means avoiding the salt shaker and not adding salt when cooking. The truth is, not adding salt at the table or when you cook will only help a little. Almost all of the sodium you eat is already in the food you buy at the grocery store or at restaurants (figure 1).  The most important thing you can do to cut down on sodium is to eat less processed food. That means that you should avoid most foods that are " "sold in cans, boxes, jars, and bags. You should also eat in restaurants less often.  To reduce the amount of sodium you get, buy fresh or fresh-frozen fruits, vegetables, and meats. (Fresh-frozen foods have had nothing added to them before freezing.) Then you can make meals at home, from scratch, with these ingredients.  As with the other changes, don't try to cut out salt all at once. Instead, choose 1 or 2 foods that have a lot of sodium and try to replace them with low-sodium choices. When you get used to those low-sodium options, find another food or 2 to change. Then keep going, until all the foods you eat are sodium-free or low in sodium.  Become more active -- If you want to be more active, you don't have to go to the gym or get all sweaty. It is possible to increase your activity level while doing everyday things you enjoy. Walking, gardening, and dancing are just a few of the things that you might try. As with all the other changes, the key is not to do too much too fast. If you don't do any activity now, start by walking for just a few minutes every other day. Do that for a few weeks. If you stick with it, try doing it for longer. But if you find that you don't like walking, try a different activity.  Drink less alcohol -- If you are a woman, do not have more than 1 "standard drink" of alcohol a day. If you are a man, do not have more than 2. A "standard drink" is:  A can or bottle that has 12 ounces of beer  A glass that has 5 ounces of wine  A shot that has 1.5 ounces of whiskey  Where should I start? -- If you want to improve your lifestyle, start by making the changes that you think would be easiest for you. If you used to exercise and just got out of the habit, maybe it would be easy for you to start exercising again. Or if you actually like cooking meals from scratch, maybe the first thing you should focus on is eating home-cooked meals that are low in sodium.  Whatever you tackle first, choose " "specific, realistic goals, and give yourself a deadline. For example, do not decide that you are going to "exercise more." Instead, decide that you are going to walk for 10 minutes on Monday, Wednesday, and Friday, and that you are going to do this for the next 2 weeks.  When lifestyle changes are too general, people have a hard time following through.  Now go. You can do it!  All topics are updated as new evidence becomes available and our peer review process is complete.  This topic retrieved from [employer] on: Sep 21, 2021.  Topic 36828 Version 8.0  Release: 29.4.2 - C29.263  © 2021 UpToDate, Inc. and/or its affiliates. All rights reserved.  figure 1: Sources of sodium in your diet     Graphic 55472 Version 2.0    Consumer Information Use and Disclaimer   This information is not specific medical advice and does not replace information you receive from your health care provider. This is only a brief summary of general information. It does NOT include all information about conditions, illnesses, injuries, tests, procedures, treatments, therapies, discharge instructions or life-style choices that may apply to you. You must talk with your health care provider for complete information about your health and treatment options. This information should not be used to decide whether or not to accept your health care provider's advice, instructions or recommendations. Only your health care provider has the knowledge and training to provide advice that is right for you. The use of this information is governed by the [employer] End User License Agreement, available at https://www.[employer].Vonage/en/solutions/[employer]/about/ruth.The use of [employer] content is governed by the [employer] Terms of Use. ©2021 UpToDate, Inc. All rights reserved.  Copyright   © 2021 UpToDate, Inc. and/or its affiliates. All rights reserved.      DASH Diet   About this topic   DASH stands for Dietary Approaches to Stop Hypertension. The DASH diet may " help you lower blood pressure. It may also help keep you from getting high blood pressure. You will eat less fat and more fiber on the DASH diet.  This diet gives you more minerals that fight high blood pressure. Some nutrients in this diet are:  Potassium ? Acts to help you get rid of salt. This may help to lower blood pressure.  Calcium ? Makes blood vessels and muscles work the right way  Magnesium - Helps blood vessels relax  Fiber ? Helps you feel full. It also helps digestion.  What will the results be?   The DASH diet may help you:  Lower your blood pressure and cholesterol  Lower your risk for cancer, heart disease, heart attack, and stroke. It may also lower your risk for heart failure, kidney stones, and diabetes.  Lose weight or keep a healthy weight  What lifestyle changes are needed?   Add regular exercise to get the most help from this diet.  Try to lower stress. Find ways to relax.  Stop smoking. Avoid secondhand smoke.  Limit alcohol intake.  What changes to diet are needed?   Know about poor eating habits. Then, you can fix them as you work with the program.  This diet encourages fruits and vegetables, whole grains, lean meats, healthy fats, and low-fat or fat-free dairy products.  This diet is lower in saturated fats, trans-fats, cholesterol, added sugars, and sodium.  Who should use this diet?   This eating plan is good for the whole family. It is also good for people with high blood pressure and those at risk for high blood pressure.  What foods are good to eat?   Grains: Try to eat 6 to 8 servings of whole grain, high fiber foods each day. These are bread, cereals, brown rice, or pasta.  Fruits and vegetables: Eat 4 to 5 servings each day. Try to pick many kinds and colors. Fresh or frozen are best. Look for low sodium or salt-free if you choose canned.  Dairy: Try to eat 2 to 3 servings of fat free and low fat milk products each day.  Lean meats, poultry, and seafood: Try to eat 6 servings or  less of lean meats, poultry, and seafood each day. Try to choose more low fat or lean meats like chicken and turkey. Eat less red meat. Eat more fish instead.  Nuts, seeds, and legumes (dry beans and peas): Try to eat 4 to 5 servings each week. Try to pick nuts such as almonds and walnuts, sunflower seeds, peanut butter, soy beans, lentils, kidney beans, and split peas.  Fats and oils: Try to eat 2 to 3 servings of fats and oils each day. Eat good fats found in fish, nuts, and avocados. Try using olive oil or vegetable oils such as canola oil. Other good oils to try are corn, safflower, sunflower, or soybean oils. Use low-sodium and low-fat salad dressing and mayonnaise.  Condiments: Pepper, herbs, spices, vinegar, lemon or lime juices are great for seasoning. Be careful to choose low-sodium or salt-free products if you use broths, soups, or soy sauce.  Sweets: Try to eat less than 5 servings each week. Choose low-fat and trans fat-free desserts. These are things like fruit flavored gelatin, sorbet, jelly beans, freedom crackers, animal crackers, low-fat fig bars, and lizbeth snaps. Eat fruit to satisfy your desire for sweets.     What foods should be limited or avoided?   Grains: Salted breads, rolls, crackers, quick breads, self-rising flours, biscuit mixes, regular bread crumbs, instant hot cereals, commercially-prepared rice, pasta, stuffing mixes  Fruits and vegetables: Commercially-prepared potatoes and vegetable mixes, regular canned vegetables and juices, vegetables frozen with sauce or pickled vegetables, processed fruits with salt or sodium  Milk: Whole milk, malted milk, chocolate milk, buttermilk, cheese, ice cream  Meats and beans: Smoked, cured, salted, or canned fish; meats or poultry such as mackey, sausages, sardines; high-fat cuts of meat like beef, lamb, or pork; chicken with the skin on it  Fats: Cut back on solid fats like butter, lard, and margarine. Eat less food with high saturated fat,  cholesterol and total fat.  Condiments and snacks: Salted and canned peas, beans, and olives; salted snack foods; fried foods; soda or other sweetened drinks  Sweets: High-fat baked goods such as muffins, donuts, pastries, commercial baked goods, candy bars  If you choose to drink alcohol, limit the amount you drink. Women should have 1 drink or less per day and men should have 2 drinks or less per day.  Helpful tips   Avoid eating canned vegetables and processed foods. These have a lot of salt in them. Look for a low-salt or low-sodium choice.  Try baking or broiling instead of frying food.  Write down the foods you eat. This will help you track what you have eaten each week.  When you go to a grocery store, have a list or a meal plan. Do not shop when you are hungry to avoid cravings for foods.  Read food labels with care. They will show you how much is in a serving. The amount is given as a percentage of the total amount you need each day. Reading labels will help you make healthy food choices.       Avoid fast foods.  Talk to your doctor or dietitian to see if you need vitamin and mineral supplements to help you balance your diet.  Talk to a dietitian for help.  Where can I learn more?   Academy of Nutrition and Dietetics  https://www.eatright.org/health/wellness/heart-and-cardiovascular-health/dash-diet-reducing-hypertension-through-diet-and-lifestyle   FamilyDoctor.org  http://familydoctor.org/familydoctor/en/prevention-wellness/food-nutrition/weight-loss/the-dash-diet-healthy-eating-to-control-your-blood-pressure.html   Last Reviewed Date   2021-03-15  Consumer Information Use and Disclaimer   This information is not specific medical advice and does not replace information you receive from your health care provider. This is only a brief summary of general information. It does NOT include all information about conditions, illnesses, injuries, tests, procedures, treatments, therapies, discharge instructions or  life-style choices that may apply to you. You must talk with your health care provider for complete information about your health and treatment options. This information should not be used to decide whether or not to accept your health care providers advice, instructions or recommendations. Only your health care provider has the knowledge and training to provide advice that is right for you.  Copyright   Copyright © 2021 UpToDate, Inc. and its affiliates and/or licensors. All rights reserved.    Why Water Is Important to Health   About this topic   Your body needs a certain amount of water to work the right way. Your body takes in water from the fluids you drink and the food you eat. This helps your body get rid of waste products. Water also helps keep your temperature normal, helps with digestion, protects your spinal cord, and lubricates your joints.  If you dont have enough water, doctors say you are dehydrated. You must take in enough water each day to replace what your body loses when you:  Sweat  Pass urine  Have a bowel movement  Breathe  You may lose even more water than normal if you are sick or hurt with something like:  A fever  A burn  An illness where you throw up or have loose stools  Some medicines can also make you lose more water than normal.  General   Most people have heard they need 6 to 8 glasses of water each day. This suggestion is not backed by science. Different people need to drink different amounts of water. How much you need to drink is based on things like your age, body, health, weather, and how active you are. It is even possible to drink too much water in some cases. Being thirsty is your bodys way of telling you it needs fluids.  Good ways to make sure you take in enough fluids include:  Drink healthy fluids when you are thirsty. These are things like water, low-fat milk, plain or flavored seltzer, or unsweetened tea or coffee.  Eat foods that have a higher water content. Fruits  and vegetables like strawberries, watermelon, tomatoes, and celery all have high water content.  Drink water before, during, and after a workout. Only drink sports drinks if you plan to exercise at an intense rate for more than an hour. Sports drinks have carbohydrates and electrolytes that can help with recovery.  Drink water with meals.  There are some kinds of drinks that are not healthy and should be limited or avoided. Try NOT to drink:  Sugary drinks like soda, sports drinks, or sweetened tea or coffee. These can add calories to your diet and lead to tooth decay.  Energy drinks. These can have a lot of caffeine and sugar in them.  Juice drinks. These often have limited amounts of juice and a lot of sugar in them.  Too much alcohol or caffeine. Both of these can cause dehydration.  Tips to increase your fluid intake:  Keep a bottle of water with you. This can encourage you to drink more.  Add fruits or vegetables to plain water to change the taste. Add berries, aldo, or cucumbers to your water to flavor it.  When you are hungry, you might actually be thirsty. True hunger will not go away by drinking water. Water can help you manage your weight.  If you have trouble remembering to drink water, try drinking water on a schedule. Set specific times for when you will drink water.  Choose water when eating at restaurants.  Where can I learn more?   American Association of Family Physicians  https://familydoctor.org/hydration-why-its-so-important/   Better Health Channel  https://www.betterhealth.efra.gov.au/health/HealthyLiving/water-a-vital-nutrient   Centers for Disease Control and Prevention  https://www.cdc.gov/healthywater/drinking/nutrition/index.html   Kids Health  https://kidshealth.org/en/kids/water.html   Last Reviewed Date   2021-09-09  Consumer Information Use and Disclaimer   This information is not specific medical advice and does not replace information you receive from your health care provider. This  "is only a brief summary of general information. It does NOT include all information about conditions, illnesses, injuries, tests, procedures, treatments, therapies, discharge instructions or life-style choices that may apply to you. You must talk with your health care provider for complete information about your health and treatment options. This information should not be used to decide whether or not to accept your health care providers advice, instructions or recommendations. Only your health care provider has the knowledge and training to provide advice that is right for you.  Copyright   Copyright © 2021 UpToDate, Inc. and its affiliates and/or licensors. All rights reserved.   Weight Loss Diet   About this topic   There are many "trendy" weight loss diets that are popular today. Many of these diets can end up being more harmful than helpful. The healthiest way to lose weight is to burn more calories than you eat.  A weight loss diet should help you have a healthy view of eating. It is NOT healthy to stop eating to try and lose weight. A good diet plan will help you cut down your food intake and make healthy choices.  A healthy weight loss goal is 1 to 2 pounds (0.5 to 1 kg) per week. Reducing calories in your diet, burning calories through exercise, or both can help you lose weight. Combining a healthy diet with regular physical activity can help you get the best results.  To cut calories in your diet you can:  Switch from whole milk to 1% or skim milk.  Switch from regular cheese to low-fat or fat-free cheese.  Use healthier condiment choices:  Fat-free or low-fat sour cream or salad dressings  Spray butter  Diet syrups or jellies over regular  Try frozen yogurt as a dessert rather than eating ice cream.  Skip the chips. Snack on carrots, vegetables, or fruit. If chips are a favorite of yours, try the baked style and watch portion size.  Eat grilled, roasted, boiled, broiled, or baked meats. Avoid " deep-frying. Choose skinless poultry, lean red meat, lean cuts of pork, and fish for good protein sources.  Try flavored no-calorie ureña. Do not drink soda and juices that have many calories.  Choose fruit instead of sweets.  General   Eating smaller meals more often may be helpful. This will keep you from overeating at your next meal. Also, eating meals slowly helps you feel full faster.  If eating 3 meals is a part of your lifestyle, choose more lean proteins and higher fiber foods to fill you up at each meal.  Do not skip meals. Most often if you skip a meal, you eat too much at the next meal.  Eat smaller portions. Use a smaller plate or bowl for meals, and when you are eating out, eat half and take the rest home.  Plan ahead. Plan your meals and grocery list before going to the store. Planning will keep you from getting meals from restaurants.  Do not go to the grocery store hungry. You are more likely to buy snacks that are not good for you.  Portion out snacks. When you are having a snack, instead of grabbing the whole bag, portion a small amount out to give yourself a stopping point.  Drink water before and after your meals to help fill you up without the calories.  When eating starchy foods, choose whole-grain products. These have a lot of fiber which will make you feel full. Fiber also helps lower cholesterol and helps with bowel function.  If you need a helpful start, ask your doctor to send you to a dietitian for weight loss help.         What will the results be?   Losing excess weight will make your whole body healthier. You will have more energy for your daily activities and lower your risk for health problems.  What lifestyle changes are needed?   Stay active. Eating healthy is not always enough to lose weight. Burning calories by exercising is a big part of weight loss.  What foods are good to eat?   The key is to watch your portion sizes. It is best to choose foods that are lower in fat and  calories.  Choose lean meats:  Boneless, skinless chicken breast  Pork loin  90% lean beef  Lean turkey meat  Fresh fish (not fried)  Choose low-fat dairy products:  1% or skim milk  Spray butter or margarine  Low-fat or fat-free cheese  Frozen yogurt or low-calorie ice cream  Choose fresh fruits, vegetables, beans and lentils, and whole wheat products more often.  Choose water to drink more often. Drink diet or no-calorie beverages when you want something other than water. Aim to get your calories from the foods you eat.  Choose smart snacks:  Fruits  Vegetables  Low-fat or nonfat yogurt  Low-fat or no-fat cheese, such as cottage cheese  Unsalted nuts  Hard-boiled egg  Hummus  Guacamole  Natural peanut butter  Popcorn with no butter ? use pepper, garlic, or another spice to taste  Whole grain crackers  What foods should be limited or avoided?   Limit high-fat, high-sodium, and high-calorie foods like:  Fried foods  Processed meats  Whole-fat dairy products  Candy, cookies, chips, pastries  Sausage, mackey, any full-fat meats  Soda, juice  Beer, wine, and mixed drinks (alcohol)  Will there be any other care needed?   What do I do first before trying to lose weight?  Talk to your doctor and dietitian to see if you need to lose weight. Work with them to set your weight loss goals.  If you have a chronic illness, such as high blood sugar or high blood pressure, ask a doctor or dietitian what diet and exercise is right for you.  Ask your doctor about how much you are able to exercise and what type of exercise is good for you.  Helpful tips   Keep a food journal to help keep you on track.  Join a support group.  Tips for burning calories:  If your workplace is near your house, choose to walk or bike to work instead of driving.  Take 20-minute walks each day. Walk around during your lunch break. You will not only burn calories, but will raise your energy for the rest of the day.  Take the stairs over the elevator.  Join a  gym or exercise class with a friend.  Try to exercise 30 minutes a day for overall health. Three 10-minute sessions work too. Aim for 60 to 90 minutes a day to lose weight.  Drink lots of water before, during, and after exercise.  Where can I learn more?   Academy of Nutrition and Dietetics  https://www.eatright.org/health/weight-loss/your-health-and-your-weight/back-to-basics-for-healthy-weight-loss   Centers for Disease Control and Prevention  https://www.cdc.gov/healthyweight/healthy_eating/index.html   Familydoctor.org  https://familydoctor.org/nutrition-weight-loss-need-know-fad-diets/   [facility]  https://www.nhs.uk/live-well/healthy-weight/12-tips-to-help-you-lose-weight/?tabname=you-and-your-weight   Last Reviewed Date   2021-06-24  Consumer Information Use and Disclaimer   This information is not specific medical advice and does not replace information you receive from your health care provider. This is only a brief summary of general information. It does NOT include all information about conditions, illnesses, injuries, tests, procedures, treatments, therapies, discharge instructions or life-style choices that may apply to you. You must talk with your health care provider for complete information about your health and treatment options. This information should not be used to decide whether or not to accept your health care providers advice, instructions or recommendations. Only your health care provider has the knowledge and training to provide advice that is right for you.  Copyright   Copyright © 2021 [employer] and its affiliates and/or licensors. All rights reserved.        Weight Loss Tips   About this topic   More and more people are concerned about their weight. You can choose from many different programs. The goal of a weight loss program may be to cut down on calories or to lose extra weight through exercise.  Losing weight may mean changing your ideas about food. Going on a diet and losing weight  "does not mean starving yourself. It means cutting down on the amount of food you eat, making healthy food choices, and being active.  General   Ideally, you need to lose 1 to 2 pounds (0.5 to 1 kg) a week for a healthy weight loss. Losing too much weight too fast is not good. When you take in fewer calories, you will lose weight. Your ideal calorie and weight goal depends on your current age, weight, height, and personal goals. Ask your doctor or dietitian what your ideal weight is.  You need to burn 3500 calories to lose 1 pound (0.5 kg). That means cutting out 500 calories every day for 7 days. You can cut out 500 calories per day by eating or drinking fewer calories, burning them through exercise, or doing both. To lose that extra weight and stay healthy:  Take time to exercise.  Exercise regularly. Burn calories with activity and exercise. Exercise can help you lose weight and it also strengthens your muscles. Set a schedule where you will have time to do exercises. With just 30 to 60 minutes of exercise each day, you could burn 500 extra calories. Your metabolism stays elevated for a period of time after exercise.  If you don't have time for a 30 minute workout, try three 10 minute exercises each day.  If you work near your home, walk to work. Walking is a very good form of exercise.  Take a 20 minute walk each day. Walk during your lunch break. Park far away, so you have to walk more.  Take the steps instead of elevators. You will burn more calories this way.  If you have an illness, like diabetes or high blood pressure, ask your doctor how much exercise is right for you.  Choose healthy snacks.  Low calorie healthy snacks are a good thing. They help your blood sugar stay even and prevent you from overeating at meals. Choose a balanced snack, such as a small apple with 2 tablespoons (30 grams) of peanut butter.  Keep in mind, even "low calorie" foods can add up. Just because you choose low calorie foods does " not mean you do not have to count the calories you eat.  Pack a few fresh fruits or a small salad to take to work or school. Avoid buying a snack at the nearest vending machine.  When you feel thirsty, drink water. Water has no calories and is a very good thirst quencher.  Plan healthy meals.  Plan ahead. Keep a diary of foods that are low in calories. You can also make a list of meal plans for your breakfast, lunch, and dinner. Planning ahead will prevent you from eating out at a fast food place or restaurant.  Make a grocery list before shopping so you only buy food you need. Don't go to the store hungry.  Visit a dietitian. This person will help you make meal plans that will help you lose weight.  Add fiber to your meals. Adding fiber helps you to feel full for a longer amount of time.  Take care when eating out.  Choose lower fat and lower calorie meals. Try a seafood, lean meat, or vegetarian entrée.  Share a meal with a friend.  Try a salad and appetizer instead of an entree.  Ask for a to-go box when dinner is served and put half of your meal in it for a later meal.  Have fruit for dessert.  Drink water instead of other high calorie drinks.  Learn not to overeat.  Watch your portions. For example, the recommended serving size of meat is 3 ounces (90 grams). This is the size of a deck of playing cards. Two tablespoons (30 grams) of peanut butter is the size of a ping-pong ball. One medium fruit is the size of a baseball.  Use a smaller plate or glass during dinner for less calorie intake.  Try drinking a glass or two of water before eating. This may make you feel more full and help you to eat less.  Eat slowly. Take at least 30 minutes to eat. This gives time for your brain to tell your stomach you are full. This will help you avoid overeating.  Some people eat smaller meals more often to help not to overeat. If you can eat six small meals, make them healthy and low calorie. If three meals are best for you,  know your calorie level for the day and spread it out into three healthy low calorie meals.     What will the results be?   Losing weight may make you healthier. You also may have more energy for your daily activities. You may lower disease risk. You may also add years to your life.  What changes to diet are needed?   Learn how to read nutrition labels. Know the serving size. Knowing the calories in an item will help you make healthier choices and lose weight.  Keep a diary of the food you eat. This will help you count the calories you are taking in.  Make a menu in advance. This will help you make good choices to include in your diet.  Avoid eating 2 hours before bedtime to allow for digestion. If you eat right before you go to bed, you may also have worse heartburn.  Be sure to count the calories in the things you drink. You may want to stop drinking soda pop, beer, wine, and mixed drinks (alcohol). Some coffee drinks also have a lot of calories in them.  Who should use this diet?   A weight loss diet may be needed for people with a calculated body mass index of 25 and over. This means you are overweight or obese.  Who should not use this diet?   People with BMI of 18.5 or lower should not use this diet. Do not use this diet if your doctor does not recommend weight loss.  What foods are good to eat?   Choose foods that are nutritious. Remember, portion control is key. Even a low calorie food can become high in calories if you have too big of a serving. Here is a list of foods that are good to eat:  Vegetables:   Broccoli  Asparagus  Spinach  Green leafy vegetables  Tomatoes  Onions  Mushrooms  Cucumbers  Zucchini  Lean proteins:   Egg whites  Beans including kidney, navy, black, and chickpeas  Grilled, broiled, or baked skinless chicken breast  Grilled, broiled, or baked skinless turkey breast  Lean beef  [place] meat  Grilled, broiled, or baked fish  Beans  Nuts, such as almonds, cashews, and  pistachios  Seeds  Whole grains and carbs:   Oatmeal  Brown rice  Sweet potatoes  Cereal  Whole grain bread or pasta  Fruits:   Apples  Grapefruit  Blueberries  Oranges  Bananas  Grapes  Peaches  Pineapple  Strawberries  Dairy:   Fat-free or low-fat milk and cheese  Low fat yogurt  Soy, rice, or almond milk  What foods should be limited or avoided?   Limit or avoid foods that are high in calories like:  Junk foods  Fried foods  Fatty foods  Processed meats  Food with saturated and trans fat  Whole fat dairy products  Butter  Cheese  Ice cream  Food and drinks with a lot of sugar. Some examples are beer, wine, mixed drinks (alcohol), carbonated sodas, cakes, and cookies.  When do I need to call the doctor?   Weakness  Fast heartbeat  Dizziness  Helpful tips   Join a support group and an exercise group. It is much easier to lose weight if you have support and encouragement.  Do not skip meals. If you skip a meal, you most likely will overeat at that next meal.  Eat at the dining room table instead in front of the TV to help monitor intake.  Where can I learn more?   [employer]y [employer] Dietetics  https://www.eatright.org/health/weight-loss/your-health-and-your-weight/back-to-basics-for-healthy-weight-loss   Centers for Disease Control and Prevention  https://www.cdc.gov/healthyweight/   Weight-Control Information Network  https://www.niddk.nih.gov/health-information/diet-nutrition/changing-habits-better-health   Last Reviewed Date   2021-08-09  Consumer Information Use and Disclaimer   This information is not specific medical advice and does not replace information you receive from your health care provider. This is only a brief summary of general information. It does NOT include all information about conditions, illnesses, injuries, tests, procedures, treatments, therapies, discharge instructions or life-style choices that may apply to you. You must talk with your health care provider for complete information  about your health and treatment options. This information should not be used to decide whether or not to accept your health care providers advice, instructions or recommendations. Only your health care provider has the knowledge and training to provide advice that is right for you.  Copyright   Copyright © 2021 [employer] and its affiliates and/or licensors. All rights reserved.